# Patient Record
Sex: MALE | Race: WHITE | NOT HISPANIC OR LATINO | URBAN - METROPOLITAN AREA
[De-identification: names, ages, dates, MRNs, and addresses within clinical notes are randomized per-mention and may not be internally consistent; named-entity substitution may affect disease eponyms.]

---

## 2023-10-11 ENCOUNTER — LAB (OUTPATIENT)
Dept: LAB | Facility: LAB | Age: 23
End: 2023-10-11

## 2023-10-11 DIAGNOSIS — E55.9 VITAMIN D DEFICIENCY, UNSPECIFIED: ICD-10-CM

## 2023-10-11 DIAGNOSIS — Z12.5 ENCOUNTER FOR SCREENING FOR MALIGNANT NEOPLASM OF PROSTATE: ICD-10-CM

## 2023-10-11 DIAGNOSIS — E03.9 HYPOTHYROIDISM, UNSPECIFIED: ICD-10-CM

## 2023-10-11 DIAGNOSIS — N39.0 URINARY TRACT INFECTION, SITE NOT SPECIFIED: ICD-10-CM

## 2023-10-11 DIAGNOSIS — E78.5 HYPERLIPIDEMIA, UNSPECIFIED: ICD-10-CM

## 2023-10-11 DIAGNOSIS — D51.9 VITAMIN B12 DEFICIENCY ANEMIA, UNSPECIFIED: ICD-10-CM

## 2023-10-11 DIAGNOSIS — D52.9 FOLATE DEFICIENCY ANEMIA, UNSPECIFIED: ICD-10-CM

## 2023-10-11 DIAGNOSIS — E11.9 TYPE 2 DIABETES MELLITUS WITHOUT COMPLICATIONS (MULTI): Primary | ICD-10-CM

## 2023-10-11 DIAGNOSIS — E11.9 TYPE 2 DIABETES MELLITUS WITHOUT COMPLICATIONS (MULTI): ICD-10-CM

## 2023-10-11 LAB
ERYTHROCYTE [DISTWIDTH] IN BLOOD BY AUTOMATED COUNT: 12.3 % (ref 11.5–14.5)
EST. AVERAGE GLUCOSE BLD GHB EST-MCNC: 82 MG/DL
HBA1C MFR BLD: 4.5 %
HCT VFR BLD AUTO: 46 % (ref 41–52)
HGB BLD-MCNC: 15.8 G/DL (ref 13.5–17.5)
MCH RBC QN AUTO: 30.1 PG (ref 26–34)
MCHC RBC AUTO-ENTMCNC: 34.3 G/DL (ref 32–36)
MCV RBC AUTO: 88 FL (ref 80–100)
NRBC BLD-RTO: 0 /100 WBCS (ref 0–0)
PLATELET # BLD AUTO: 247 X10*3/UL (ref 150–450)
PMV BLD AUTO: 10.6 FL (ref 7.5–11.5)
RBC # BLD AUTO: 5.25 X10*6/UL (ref 4.5–5.9)
WBC # BLD AUTO: 4.5 X10*3/UL (ref 4.4–11.3)

## 2023-10-11 PROCEDURE — 85027 COMPLETE CBC AUTOMATED: CPT

## 2023-10-11 PROCEDURE — 81001 URINALYSIS AUTO W/SCOPE: CPT

## 2023-10-11 PROCEDURE — 36415 COLL VENOUS BLD VENIPUNCTURE: CPT

## 2023-10-11 PROCEDURE — 83036 HEMOGLOBIN GLYCOSYLATED A1C: CPT

## 2023-10-11 PROCEDURE — 84153 ASSAY OF PSA TOTAL: CPT

## 2023-10-11 PROCEDURE — 84443 ASSAY THYROID STIM HORMONE: CPT

## 2023-10-11 PROCEDURE — 80053 COMPREHEN METABOLIC PANEL: CPT

## 2023-10-11 PROCEDURE — 80061 LIPID PANEL: CPT

## 2023-10-11 PROCEDURE — 82306 VITAMIN D 25 HYDROXY: CPT

## 2023-10-11 PROCEDURE — 87086 URINE CULTURE/COLONY COUNT: CPT

## 2023-10-11 PROCEDURE — 82746 ASSAY OF FOLIC ACID SERUM: CPT

## 2023-10-11 PROCEDURE — 82607 VITAMIN B-12: CPT

## 2023-10-12 LAB
25(OH)D3 SERPL-MCNC: 14 NG/ML (ref 30–100)
ALBUMIN SERPL BCP-MCNC: 4.9 G/DL (ref 3.4–5)
ALP SERPL-CCNC: 53 U/L (ref 33–120)
ALT SERPL W P-5'-P-CCNC: 45 U/L (ref 10–52)
ANION GAP SERPL CALC-SCNC: 14 MMOL/L (ref 10–20)
AST SERPL W P-5'-P-CCNC: 24 U/L (ref 9–39)
BACTERIA UR CULT: NO GROWTH
BILIRUB SERPL-MCNC: 0.6 MG/DL (ref 0–1.2)
BUN SERPL-MCNC: 8 MG/DL (ref 6–23)
CALCIUM SERPL-MCNC: 9.8 MG/DL (ref 8.6–10.6)
CHLORIDE SERPL-SCNC: 102 MMOL/L (ref 98–107)
CHOLEST SERPL-MCNC: 163 MG/DL (ref 0–199)
CHOLESTEROL/HDL RATIO: 4
CO2 SERPL-SCNC: 26 MMOL/L (ref 21–32)
CREAT SERPL-MCNC: 0.89 MG/DL (ref 0.5–1.3)
FOLATE SERPL-MCNC: 14.7 NG/ML
GFR SERPL CREATININE-BSD FRML MDRD: >90 ML/MIN/1.73M*2
GLUCOSE SERPL-MCNC: 86 MG/DL (ref 74–99)
HDLC SERPL-MCNC: 41.1 MG/DL
LDLC SERPL CALC-MCNC: 96 MG/DL (ref 110–150)
MUCOUS THREADS #/AREA URNS AUTO: NORMAL /LPF
NON HDL CHOLESTEROL: 122 MG/DL (ref 0–119)
POTASSIUM SERPL-SCNC: 4.3 MMOL/L (ref 3.5–5.3)
PROT SERPL-MCNC: 8 G/DL (ref 6.4–8.2)
PSA SERPL-MCNC: 0.36 NG/ML
RBC #/AREA URNS AUTO: NORMAL /HPF
SODIUM SERPL-SCNC: 138 MMOL/L (ref 136–145)
TRIGL SERPL-MCNC: 131 MG/DL (ref 0–149)
TSH SERPL-ACNC: 1.36 MIU/L (ref 0.44–3.98)
VIT B12 SERPL-MCNC: 349 PG/ML (ref 211–911)
VLDL: 26 MG/DL (ref 0–40)
WBC #/AREA URNS AUTO: NORMAL /HPF

## 2024-07-31 ENCOUNTER — ALLIED HEALTH (OUTPATIENT)
Dept: INTEGRATIVE MEDICINE | Facility: CLINIC | Age: 24
End: 2024-07-31

## 2024-07-31 DIAGNOSIS — R11.2 NAUSEA & VOMITING: Primary | ICD-10-CM

## 2024-07-31 DIAGNOSIS — R10.9 ABDOMINAL PAIN: ICD-10-CM

## 2024-07-31 PROCEDURE — 97139 UNLISTED THERAPEUTIC PX: CPT | Performed by: ACUPUNCTURIST

## 2024-07-31 NOTE — PROGRESS NOTES
Acupuncture Visit:     Subjective   Patient ID: Hector Pearson is a 23 y.o. male who presents for No chief complaint on file.  Seeking support for abdominal pain that vacillates between dull achy and stabbing pain.  Constant nausea with intermittent emesis        Session Information  Visit Type: New patient         Review of Systems         Provider reviewed plan for the acupuncture session, precautions and contraindications. Patient/guardian/hospital staff has given consent to treat with full understanding of what to expect during the session. Before acupuncture began, provider explained to the patient to communicate at any time if the procedure was causing discomfort past their tolerance level. Patient agreed to advise acupuncturist. The acupuncturist counseled the patient on the risks of acupuncture treatment including pain, infection, bleeding, and no relief of pain. The patient was positioned comfortably. There was no evidence of infection at the site of needle insertions.    Objective   Physical Exam    Acupuncture Physical Exam  Tongue Color: Red edges  Tongue Coating: Peeled    Treatment Plan  Treatment Goals: Pain management, Nausea reduction    Acupuncture Treatment  Patient Position: Seated and reclining  Needle Guage: 42 guage /.14/ Lime green seirin, 40 guage /.16/ Red seirin  Body Points - Left: dlpfcx2, p6  Body Points - Bilateral: lr 3, li 4, st qi x2, sp 6, k 7, 10  Needle Count In: 17  Needle Count Out: 17  Needle Retention Time (min): 30 minutes  Total Face to Face Time (min): 25 minutes              Assessment/Plan

## 2024-07-31 NOTE — PATIENT INSTRUCTIONS
Follow with [facility]. Pt tolerated tx well.  Immediate pt reported benefits of minimal nausea and no pain.  Advised to engage in gentle activity following tx and hydrate.  Schedule 4-6 follow tx with re-evaluation

## 2024-08-01 ENCOUNTER — DOCUMENTATION (OUTPATIENT)
Dept: GASTROENTEROLOGY | Facility: HOSPITAL | Age: 24
End: 2024-08-01
Payer: COMMERCIAL

## 2024-08-01 NOTE — PROGRESS NOTES
Dr. Omid Leyva's office received a referral from Erlin Tello PA-C for this patient to undergo a SIBO breath test. Dr. Vasile Dunlap reviewed the referral on 7/30/2024. Per Dr. Dunlap, OK to schedule.    [facility] centralized schedulers were notified to call and schedule this patient. Contact Noemi Griffith RN at [phone] for any questions.      See below for supporting clinical information from the referral sent by  Erlin Tello's office and from medical records:    Diagnosis: Generalized abdominal pain     Reason for Referral     Consultation (Routine) - Pending Review  Reason for Referral - Consultation (Routine) - Pending Review  Specialty          Diagnoses / Procedures          Referred By Contact            Referred To Contact  Gastroenterology        Diagnoses  Generalized abdominal pain     Procedures  MT OFFICE/OUTPATIENT NEW HIGH MDM 60 MINUTES             Erlin Tello PA-C  [address]  [address]  [place] [address]  Phone: [phone]  Fax: [phone]      Omid Leyva DO  [address] [place] [address]  Phone: [phone]     Reason for Referral - Consultation (Routine) - Pending Review  Referral ID       Status  Reason Start Date        Expiration Date            Visits Requested         Visits Authorized  [id number]          Pending Review          Specialty Services Required             7/29/2024 7/29/2025       1          1     Reason for Referral - Consultation (Routine) - Pending Review  Scheduling Instructions  SIBO breath test     Progress Notes  - documented in this encounter  Erlin Tello PA-C - 07/29/2024 1:00 PM EDT  Formatting of this note is different from the original.  Images from the original note were not included.     [facility] MEDICAL [facility] GASTROENTEROLOGY  [address]  [address]  [facility] [address]  Dept: [phone]  Dept Fax: [phone]  Loc: [phone]     Visit type: Follow up.     Reason for Visit:  Follow-up     Assessment and Plan     Problem List Items Addressed This Visit  None  Visit Diagnoses     History of esophagogastroduodenoscopy (EGD) - Primary  Relevant Medications  famotidine (Pepcid) 20 MG tablet  History of colonoscopy  Nausea  Generalized abdominal pain  Relevant Orders  External referral to Gastroenterology  History of marijuana use  History of alcohol use  History of tobacco use           -EGD with gastric and duodenal biopsies for further evaluation of generalized abdominal pain, nausea, vomiting, weight loss, overall unrevealing. Most likely Cannabidiol Hyperemesis syndrome. It is N/V is slowing resolving after stopping THC two week ago. CT shows normal pancreas, liver, gallbladder. US shows normal gallbladder, liver. HIDA and GES are both unremarkable. CBC and CMP are unremarkable.  -Stop Omeprazole 40 mg daily, Carafate.  -Start Pepcid 20 mg daily.  -Continue antiemetics PRN.  -Continue secession from products containing THC.  -Continue secession from products containing EtOH.  -Continue secession from NSAIDs.  -Hector wishes to be screened for SIBO, will place orders.  -Colonoscopy for further evaluation of abnormal CT unremarkable. Repeat colonoscopy age 47 for screening.     Follow up in about 3 months (around 10/29/2024).     Advised patient to call office with new or worsening symptoms, questions, or concerns. Patient verbalized understanding and agreement of plan.     Kameron Bass is a 23 y.o. male who presents as a follow up to EGD and colonoscopy. He was referred by Dr. Tobi Esparza MD regarding Nausea; Abnormal weight loss.     HPI     Last dose of THC was 7/15/2024. Feels slightly better. Last episode of emesis was a couple days ago, previously daily. Still have continued abdominal pain and nausea. Has not had to take anti emetic recently. Has a BM daily that is formed or loose. No hematochezia.     EtOH: None since 1/2024. Previously drinking 375 mL liquor for  days.  Tobacco: Previously Zyn and Cigarettes.  NSAIDs: None.  Illicit drugs: Mariajuana every other day.  Family History of Colon Cancer: None.  Prior EGD/Colonoscopy: None.  Prior Abdominal Surgery: None.     Review of Systems  Constitutional: Negative for appetite change, chills, diaphoresis, fever and unexpected weight change.  HENT: Negative for trouble swallowing.  Eyes: Negative for pain and redness.  Respiratory: Negative for cough, choking and shortness of breath.  Cardiovascular: Negative for chest pain.  Gastrointestinal: Positive for abdominal pain and nausea. Negative for abdominal distention, anal bleeding, blood in stool, constipation, diarrhea, rectal pain and vomiting.  Musculoskeletal: Positive for arthralgias (ankles, knees, shoulders.).  Skin: Negative for color change, pallor and rash.  Allergic/Immunologic: Negative for food allergies.        No Known Allergies     Outpatient Medications Prior to Visit  Medication Sig Dispense Refill  LORazepam (Ativan) 0.5 MG tablet Take 1 tablet (0.5 mg) by mouth every 8 hours as needed for anxiety for up to 10 days. (Patient not taking: Reported on 7/29/2024)  mirtazapine (Remeron) 7.5 MG tablet Take 1 tablet (7.5 mg) by mouth Nightly. (Patient not taking: Reported on 7/29/2024)  promethazine (Phenergan) 12.5 MG tablet Take 1 tablet (12.5 mg) by mouth every 6 hours as needed for nausea or vomiting for up to 7 days. (Patient not taking: Reported on 7/29/2024)  pantoprazole (ProtoNix) 40 MG EC tablet Take 1 tablet (40 mg) by mouth every morning (before breakfast). Do not crush, chew, or split. Do not start before July 23, 2024. (Patient not taking: Reported on 7/29/2024)     No facility-administered medications prior to visit.        Patient Active Problem List  Diagnosis Date Noted Date Diagnosed  Intractable nausea and vomiting 07/21/2024  Priority: Medium  Shoulder instability, left 01/22/2019     Social History     Tobacco Use  Smoking status:  Never  Smokeless tobacco: Never  Substance Use Topics  Alcohol use: Not Currently        No family history on file.     Objective     Pulse 78  Temp 36.2 °C (97.2 °F)  Ht 6' (1.829 m)  Wt 168 lb (76.2 kg)  SpO2 99%  BMI 22.78 kg/m²  Physical Exam  Vitals reviewed.  Constitutional:  General: He is not in acute distress.  Appearance: Normal appearance. He is not ill-appearing.  Cardiovascular:  Rate and Rhythm: Normal rate and regular rhythm.  Pulmonary:  Effort: Pulmonary effort is normal.  Breath sounds: Normal breath sounds.  Abdominal:  General: Bowel sounds are normal. There is no distension.  Palpations: Abdomen is soft. There is no mass.  Tenderness: There is abdominal tenderness (mild epigastric tenderness with deep palpation). There is no guarding or rebound.  Hernia: No hernia is present.  Neurological:  General: No focal deficit present.  Mental Status: He is alert and oriented to person, place, and time.  Psychiatric:  Mood and Affect: Mood normal.  Behavior: Behavior normal.     Data Reviewed and Summarized     Labs:     Lab Results  Component Value Date  WBC 5.1 07/21/2024  HGB 17.3 07/21/2024  HCT 48.6 07/21/2024  MCV 78.9 07/21/2024   07/21/2024     Lab Results  Component Value Date  GLUCOSE 94 07/21/2024  CALCIUM 10.2 07/21/2024   07/21/2024  K 5.1 07/21/2024  CO2 17 (L) 07/21/2024   07/21/2024  BUN 16 07/21/2024  CREATININE 0.98 07/21/2024     Lab Results  Component Value Date  ALT 14 07/21/2024  AST 33 07/21/2024  ALKPHOS 47 07/21/2024  BILITOT 1.6 (H) 07/21/2024     Lab Results  Component Value Date  LIPASE 39 07/21/2024     Lab Results  Component Value Date  CREACTIVEPRO <3.0 07/25/2024     Thyroid Stimulating Hormone 11/11/2023  Order: [id number]  Component  Ref Range & Units 9 mo ago  Thyroid Stimulating Hormone  0.44 - 3.98 mIU/L 1.36        Component 8 d ago  THC, URINE Positive  Comment: THC metabolites have been screened for by Immunoassay at a 50 ng/mL threshold.  POSITIVE results are not confirmed by a more specific alternative method unless requested. If confirmation is needed, request confirmation under separate order. NOTE: These results are for medical treatment only. Analysis performed using non-forensic procedures.     Imaging/Testing:     EGD 7/24/2024 Dr. Marcial  Procedure: Upper GI endoscopy  Indications: Upper abdominal symptoms that persist despite an  appropriate trial of therapy, , Early satiety, Nausea  with vomiting, Weight loss, loose stool  Findings:  The examined duodenum was normal. Biopsies for histology were taken with  a cold forceps for evaluation of celiac disease.  Patchy mildly erythematous mucosa without bleeding was found in the  stomach. Biopsies were taken with a cold forceps for Helicobacter pylori  testing.  The examined esophagus was normal.  Impression:  - Normal examined duodenum. Biopsied.  - Erythematous mucosa in the stomach. Biopsied.  - Normal esophagus.  Recommendation:  - Patient has a contact number available for emergencies. The signs and  symptoms of potential delayed complications were discussed with the  patient. Return to normal activities tomorrow. Written discharge  instructions were provided to the patient.  - Await pathology results.  - Continue present medications.  - Resume previous diet.  - Return to referring physician as previously scheduled.  - Perform a colonoscopy today.     Colonoscopy 7/24/2024 Dr. Marcial  Procedure: Colonoscopy  Indications: Loose stool, Evaluation of abnormal imaging study of  the gastrointestinal tract (likely to be clinically  significant)  , This is the patient's first colonoscopy  Findings:  The digital rectal exam was normal.  An area of mucosa in the terminal ileum was slightly nodular.  Endoscopically seemed suggestive of lymphoid aggregates. Biopsies were  taken with a cold forceps for histology.  The ascending colon appeared normal. Biopsies were taken with a cold  forceps for  histology.  There is no endoscopic evidence of bleeding or inflammation in the  entire colon. Biopsies were taken with a cold forceps for histology.  Internal hemorrhoids were found during retroflexion. The hemorrhoids  were small and Grade I (internal hemorrhoids that do not prolapse).  Liquid brown stool was found in the entire colon. There was no fresh or  old blood seen.  Impression:  - Preparation of the colon was fair.  - Nodular ileal mucosa. Biopsied.  - The ascending colon is normal. Biopsied.  - Internal hemorrhoids.  - Stool in the entire examined colon.  Recommendation:  - Patient has a contact number available for emergencies. The signs and  symptoms of potential delayed complications were discussed with the  patient. Return to normal activities tomorrow. Written discharge  instructions were provided to the patient.  - Await pathology results.  - Continue present medications.  - Resume previous diet.  - High fiber diet.  - Repeat colonoscopy is recommended. The colonoscopy date will be  determined after pathology results from today's exam become available  for review.  - Return to referring physician as previously scheduled.  - Return to GI clinic as previously scheduled.     CT abdomen pelvis w contrast 7/21/2024  FINDINGS:     LOWER CHEST: within normal limits.     ABDOMEN:  LIVER: within normal limits.  BILE DUCTS: normal caliber.  GALLBLADDER: No calcified gallstones. Normal caliber wall.  PANCREAS: within normal limits.  SPLEEN: Top normal size spleen  ADRENALS: within normal limits.  KIDNEYS: within normal limits.     PELVIS:  REPRODUCTIVE ORGANS: no pelvic masses.  URETERS: within normal limits.  BLADDER: within normal limits.     BOWEL: Normal caliber. Findings within normal limits. No enlarged mesenteric lymph nodes.  PERITONEUM: no ascites or free air, no fluid collection.  VESSELS: Within normal limits.  LYMPH NODES: No enlarged nodes.  RETROPERITONEUM: within normal limits.  ABDOMINAL WALL:  within normal limits.  BONES: within normal limits.     IMPRESSION:  No acute process in the abdomen or pelvis.     CT abdomen pelvis w contrast 7/5/2024  FINDINGS:  Chest base: Normal.     Liver: Normal size and contour. 8 mm right hepatic lobe hypodensity is too small to definitively characterize, but likely represents a small cyst. No follow-up imaging is recommended.     Biliary tree: No calcified gallstones. No pericholecystic fluid. No abnormal biliary ductal dilatation.     Pancreas: Normal.     Spleen: Normal.     Adrenals: Normal.     Kidneys: Symmetric contrast enhancement without hydronephrosis. No focal lesion.     Free fluid: None.     Vasculature: Normal caliber.     Bowel: Contrast material in the stomach, small bowel, and colon. No bowel obstruction. There appears to be mild circumferential wall thickening of the entire ascending colon. This portion of the colon is not well distended, slightly limiting evaluation of the colonic wall. No significant adjacent inflammation. No evidence of acute appendicitis. No bowel wall pneumatosis.     Lymphadenopathy: None.     Pelvic organs/viscera: No mass identified.     Osseous structures: No abnormality.     Soft Tissues: Normal.     IMPRESSION:  Question of mild proximal colitis. Otherwise, unremarkable examination.     NM gastric emptying solid 7/2/2024  IMPRESSION:  Impression: NORMAL GASTRIC EMPTYING OF SOLIDS.     NM HIDA 7/2/2024  IMPRESSION:  Normal biliary function in response to CCK.     US abdomen limited 12/28/2023  FINDINGS:     LIVER  Echogenicity: There is increased hepatic parenchymal echogenicity and coarsening of the hepatic parenchymal echotexture.  Surface nodularity: Not visualized.  Masses (size and location): None.     GALLBLADDER  Size and morphology: Normal caliber and wall thickness.  Cholelithiasis: None.  Pericholecystic fluid: None.  Sonographic Chun's sign: Negative.     BILE DUCTS  Intrahepatic ducts: No biliary dilation  Common  bile duct: Normal caliber. Diameter: 2 mm     PANCREAS  Evaluation is limited due to overlying bowel gas artifact.     RIGHT KIDNEY  Size: 11.8 x 5.7 x 4.6 cm  Renal cortex: Normal.  Hydronephrosis: None.  Calculi, cysts or masses: None.     OTHER FINDINGS  None.     IMPRESSION:  1. No acute sonographic abnormality identified. Perihepatic steatosis.  2. Suboptimal visualization of the pancreas.        Erlin Tello PA-C  4:05 PM  07/29/24     Electronically signed by Erlin Tello PA-C at 07/29/2024 4:14 PM EDT  Plan of Treatment  - documented as of this encounter  Plan of Treatment - Upcoming Encounters  Upcoming Encounters  Date    Type    Department    Care Team (Latest Contact Info)            Description  10/29/2024 1:00 PM EDT       Office Visit       [facility]  [address]  [address] [place] [id number] [phone] Erlin Tello PA-C  [address]  [address] [place] [address] [id number] [phone] (Work)  [phone] (Fax)           Plan of Treatment - Scheduled Referrals  Scheduled Referrals  Name  Type    Priority Associated Diagnoses  Order Schedule  External referral to Gastroenterology Outpatient Referral            Routine            Generalized abdominal pain   Expected: 07/29/2024 (Approximate), Expires: 01/29/2025

## 2024-08-07 ENCOUNTER — APPOINTMENT (OUTPATIENT)
Dept: INTEGRATIVE MEDICINE | Facility: CLINIC | Age: 24
End: 2024-08-07
Payer: MEDICAID

## 2024-08-07 DIAGNOSIS — R10.9 ABDOMINAL PAIN: ICD-10-CM

## 2024-08-07 DIAGNOSIS — R11.0 NAUSEA: Primary | ICD-10-CM

## 2024-08-07 DIAGNOSIS — F32.A DEPRESSION: ICD-10-CM

## 2024-08-07 PROCEDURE — 97139 UNLISTED THERAPEUTIC PX: CPT | Performed by: ACUPUNCTURIST

## 2024-08-07 NOTE — PROGRESS NOTES
Acupuncture Visit:     Subjective   Patient ID: Hector Pearson is a 23 y.o. male who presents for No chief complaint on file.  Reported that he is progressively better.  Emesis is resolved since last tx.  Abdominal pain has a wrapping quality to low back pack with acid reflux and nausea.  Seeking support for depression      Session Information  Is this acupuncture treatment being billed to the patient's insurance company: No  Visit Type: Follow-up visit         Review of Systems         Provider reviewed plan for the acupuncture session, precautions and contraindications. Patient/guardian/hospital staff has given consent to treat with full understanding of what to expect during the session. Before acupuncture began, provider explained to the patient to communicate at any time if the procedure was causing discomfort past their tolerance level. Patient agreed to advise acupuncturist. The acupuncturist counseled the patient on the risks of acupuncture treatment including pain, infection, bleeding, and no relief of pain. The patient was positioned comfortably. There was no evidence of infection at the site of needle insertions.    Objective   Physical Exam    Acupuncture Physical Exam  Tongue Coating: Thick yellow    Treatment Plan  Treatment Goals: Nausea reduction, Mood modification, Pain management    Acupuncture Treatment  Patient Position: Seated and reclining  Needle Guage: 42 guage /.14/ Lime green seirin, 38 guage /.18/ Yellow seirin  Body Points - Left: dlpfcx2, k 3  Body Points - Bilateral: immune, sp 6, st qi x2, sp 9  Body Points - Right: k 7, 10  Needle Count In: 15  Needle Count Out: 15  Needle Retention Time (min): 30 minutes  Total Face to Face Time (min): 25 minutes              Assessment/Plan

## 2024-08-12 ENCOUNTER — APPOINTMENT (OUTPATIENT)
Dept: INTEGRATIVE MEDICINE | Facility: CLINIC | Age: 24
End: 2024-08-12
Payer: COMMERCIAL

## 2024-08-12 DIAGNOSIS — R11.0 NAUSEA: Primary | ICD-10-CM

## 2024-08-12 DIAGNOSIS — R10.9 STOMACH PAIN: ICD-10-CM

## 2024-08-12 DIAGNOSIS — F32.A DEPRESSION: ICD-10-CM

## 2024-08-12 PROCEDURE — 97139 UNLISTED THERAPEUTIC PX: CPT | Performed by: ACUPUNCTURIST

## 2024-08-12 NOTE — PROGRESS NOTES
Acupuncture Visit:     Subjective   Patient ID: Hector Pearson is a 23 y.o. male who presents for No chief complaint on file.  Pt reported benefits last about 5-7 days with onset of nausea and pain progressively decreasing intensity.  Presenting today with mild nausea and pain.        Session Information  Is this acupuncture treatment being billed to the patient's insurance company: No  Visit Type: Follow-up visit         Review of Systems         Provider reviewed plan for the acupuncture session, precautions and contraindications. Patient/guardian/hospital staff has given consent to treat with full understanding of what to expect during the session. Before acupuncture began, provider explained to the patient to communicate at any time if the procedure was causing discomfort past their tolerance level. Patient agreed to advise acupuncturist. The acupuncturist counseled the patient on the risks of acupuncture treatment including pain, infection, bleeding, and no relief of pain. The patient was positioned comfortably. There was no evidence of infection at the site of needle insertions.    Objective   Physical Exam              Acupuncture Treatment  Patient Position: Seated and reclining  Needle Guage: 42 guage /.14/ Lime green reno  Body Points - Left: dlpfcx2, kate 4, lr4, ht 7  Body Points - Bilateral: k 9, li 15, immunex2,  Needle Count In: 13  Needle Count Out: 13  Needle Retention Time (min): 25 minutes  Total Face to Face Time (min): 25 minutes              Assessment/Plan

## 2024-08-13 ENCOUNTER — APPOINTMENT (OUTPATIENT)
Dept: INTEGRATIVE MEDICINE | Facility: CLINIC | Age: 24
End: 2024-08-13
Payer: COMMERCIAL

## 2024-08-15 ENCOUNTER — APPOINTMENT (OUTPATIENT)
Dept: INTEGRATIVE MEDICINE | Facility: CLINIC | Age: 24
End: 2024-08-15
Payer: COMMERCIAL

## 2024-08-21 ENCOUNTER — APPOINTMENT (OUTPATIENT)
Dept: INTEGRATIVE MEDICINE | Facility: CLINIC | Age: 24
End: 2024-08-21
Payer: COMMERCIAL

## 2024-08-21 ENCOUNTER — ALLIED HEALTH (OUTPATIENT)
Dept: INTEGRATIVE MEDICINE | Facility: CLINIC | Age: 24
End: 2024-08-21
Payer: MEDICAID

## 2024-08-21 DIAGNOSIS — M54.59 OTHER LOW BACK PAIN: Primary | ICD-10-CM

## 2024-08-21 DIAGNOSIS — F32.A ANXIETY AND DEPRESSION: ICD-10-CM

## 2024-08-21 DIAGNOSIS — F41.9 ANXIETY AND DEPRESSION: ICD-10-CM

## 2024-08-21 DIAGNOSIS — R11.2 NAUSEA AND VOMITING, UNSPECIFIED VOMITING TYPE: ICD-10-CM

## 2024-08-21 PROCEDURE — 99212 OFFICE O/P EST SF 10 MIN: CPT | Performed by: ACUPUNCTURIST

## 2024-08-21 PROCEDURE — 97810 ACUP 1/> WO ESTIM 1ST 15 MIN: CPT | Performed by: ACUPUNCTURIST

## 2024-08-21 PROCEDURE — 97811 ACUP 1/> W/O ESTIM EA ADD 15: CPT | Performed by: ACUPUNCTURIST

## 2024-08-21 NOTE — PROGRESS NOTES
"Acupuncture Visit:     Subjective   Patient ID: Hector Pearson is a 23 y.o. male who presents for No chief complaint on file.  2024 Acupuncture Benefits   08/12/2024    Human [facility] # [id number]   30 Visits per calendar year (Jan-Dec)   Covered Diagnosis; low back pain, migraine, cervical (neck) pain, osteoarthritis of knee and/or hip, nausea or vomiting related to pregnancy or chemo; or acute post operative pain.   Ref #:  [id number]     Other low back pain  Nausea and vomiting  Chronic gastritis   Anxiety and depression     Other low back pain:  Location: pain is a band that wraps around his waist and goes across his back  LBP and abdominal pain has been much wrose the past 6 months   7/10 pain level daily range   Pain radiates into the back from the front    Heat does not help reduce his pain   Ice feels good on his back/reduced pain   DX of chronic Gastritis (does not have acid reflux, IBS, ulcers, etc.)  Cannot take NSAIDS because of the gastritis   Abdominal and back pain is worse when his stomach is empty   Nausea and vomiting started around the same time as the back and stomach pain  When his nausea is worse his LBP and abdominal pain is worse   Has had 3 group sessions so far, this seems to help reduce his pain temporarily (for a few hours P tx)  9/10 nausea daily - irritability with gastritis sxs  Notes Feels a sensation \"creeping up\" his chest, denies burning sensations in upper chest/abd (notes burning in lower abdomen, however)  HX of Depression anxiety and irritability  Drinking [place] - this seems to be the only thing that helps reduce his SXS/coats his stomach   Sleep - wakes up routinely - bad dreams restless, vivid dreams   Energy level is low   Gets motion sickness with increase of his nausea   Cannot eat any acidic foods, this bothers his stomach, cannot eat fried foods, no chocolate           Session Information  Is this acupuncture treatment being billed to the patient's " insurance company: Yes  This is visit number: 1  The patient has a total number of visits of: 30  Initial Acupuncture Treatment date: 08/21/24  Name of Insurance Company: [employer]  Name of Supervising Physician: BRYCE  Visit Type: New patient  Description of present complaint: Chronic pain         Review of Systems         Provider reviewed plan for the acupuncture session, precautions and contraindications. Patient/guardian/hospital staff has given consent to treat with full understanding of what to expect during the session. Before acupuncture began, provider explained to the patient to communicate at any time if the procedure was causing discomfort past their tolerance level. Patient agreed to advise acupuncturist. The acupuncturist counseled the patient on the risks of acupuncture treatment including pain, infection, bleeding, and no relief of pain. The patient was positioned comfortably. There was no evidence of infection at the site of needle insertions.    Objective   Physical Exam                             Assessment/Plan

## 2024-08-23 ENCOUNTER — APPOINTMENT (OUTPATIENT)
Dept: INTEGRATIVE MEDICINE | Facility: CLINIC | Age: 24
End: 2024-08-23
Payer: MEDICAID

## 2024-08-27 ENCOUNTER — APPOINTMENT (OUTPATIENT)
Dept: INTEGRATIVE MEDICINE | Facility: CLINIC | Age: 24
End: 2024-08-27
Payer: MEDICAID

## 2024-08-28 ENCOUNTER — ALLIED HEALTH (OUTPATIENT)
Dept: INTEGRATIVE MEDICINE | Facility: CLINIC | Age: 24
End: 2024-08-28
Payer: MEDICAID

## 2024-08-28 ENCOUNTER — APPOINTMENT (OUTPATIENT)
Dept: INTEGRATIVE MEDICINE | Facility: CLINIC | Age: 24
End: 2024-08-28
Payer: COMMERCIAL

## 2024-08-28 ENCOUNTER — APPOINTMENT (OUTPATIENT)
Dept: INTEGRATIVE MEDICINE | Facility: CLINIC | Age: 24
End: 2024-08-28
Payer: MEDICAID

## 2024-08-28 DIAGNOSIS — R10.9 STOMACH PAIN: ICD-10-CM

## 2024-08-28 DIAGNOSIS — F41.9 ANXIETY AND DEPRESSION: ICD-10-CM

## 2024-08-28 DIAGNOSIS — R11.2 NAUSEA AND VOMITING, UNSPECIFIED VOMITING TYPE: ICD-10-CM

## 2024-08-28 DIAGNOSIS — F32.A ANXIETY AND DEPRESSION: ICD-10-CM

## 2024-08-28 DIAGNOSIS — M54.59 OTHER LOW BACK PAIN: Primary | ICD-10-CM

## 2024-08-28 PROCEDURE — 97810 ACUP 1/> WO ESTIM 1ST 15 MIN: CPT | Performed by: ACUPUNCTURIST

## 2024-08-28 PROCEDURE — 97811 ACUP 1/> W/O ESTIM EA ADD 15: CPT | Performed by: ACUPUNCTURIST

## 2024-08-28 NOTE — PROGRESS NOTES
"Acupuncture Visit:     Subjective   Patient ID: Hector Pearson is a 23 y.o. male who presents for No chief complaint on file.  2024 Acupuncture Benefits   08/12/2024    [employer] [email or web address] [facility] # [id number]   30 Visits per calendar year (Jan-Dec)   Covered Diagnosis; low back pain, migraine, cervical (neck) pain, osteoarthritis of knee and/or hip, nausea or vomiting related to pregnancy or chemo; or acute post operative pain.   Ref #:  [id number]     Other low back pain  Nausea and vomiting  Chronic gastritis   Anxiety and depression     LBP, nausea, gastritis sxs improved post acupuncture for 4-5 days p tx  Feels the anxiety and depression improved the least.    Other low back pain:  Location: pain is a band that wraps around his waist and goes across his back  LBP and abdominal pain has been much wrose the past 6 months   7/10 pain level daily range   Pain radiates into the back from the front    Heat does not help reduce his pain   Ice feels good on his back/reduced pain   DX of chronic Gastritis (does not have acid reflux, IBS, ulcers, etc.)  Cannot take NSAIDS because of the gastritis   Abdominal and back pain is worse when his stomach is empty   Nausea and vomiting started around the same time as the back and stomach pain  When his nausea is worse his LBP and abdominal pain is worse   Has had 3 group sessions so far, this seems to help reduce his pain temporarily (for a few hours P tx)  9/10 nausea daily - irritability with gastritis sxs  Notes Feels a sensation \"creeping up\" his chest, denies burning sensations in upper chest/abd (notes burning in lower abdomen, however)  HX of Depression anxiety and irritability  Drinking [place] - this seems to be the only thing that helps reduce his SXS/coats his stomach   Sleep - wakes up routinely - bad dreams restless, vivid dreams   Energy level is low   Gets motion sickness with increase of his nausea   Cannot eat any acidic foods, this bothers his stomach, " cannot eat fried foods, no chocolate           Session Information  Is this acupuncture treatment being billed to the patient's insurance company: Yes  This is visit number: 2  The patient has a total number of visits of: 30  Initial Acupuncture Treatment date: 08/21/24  Name of Insurance Company: [employer]  Name of Supervising Physician: BRYCE  Visit Type: Follow-up visit  Description of present complaint: Chronic pain         Review of Systems         Provider reviewed plan for the acupuncture session, precautions and contraindications. Patient/guardian/hospital staff has given consent to treat with full understanding of what to expect during the session. Before acupuncture began, provider explained to the patient to communicate at any time if the procedure was causing discomfort past their tolerance level. Patient agreed to advise acupuncturist. The acupuncturist counseled the patient on the risks of acupuncture treatment including pain, infection, bleeding, and no relief of pain. The patient was positioned comfortably. There was no evidence of infection at the site of needle insertions.    Objective   Physical Exam              Acupuncture Treatment  Patient Position: Supine on a table  Acupuncture Needling: Yes  Needle Guage: 36 guage /.20/ Blue seirin  Body Points: With retention  Body Points - Bilateral: YT - CV14.5 ST25 ST32 ST41 - PC6  Auricular Points: Yes  Auricular Points - Left: Mouth, neck/adrenal, PZ, BFA4, SM (4  Needle Count In: 15  Needle Count Out: 15  Needle Retention Time (min): 35 minutes  Total Face to Face Time (min): 25 minutes              Assessment/Plan

## 2024-08-29 ENCOUNTER — APPOINTMENT (OUTPATIENT)
Dept: INTEGRATIVE MEDICINE | Facility: CLINIC | Age: 24
End: 2024-08-29
Payer: MEDICAID

## 2024-08-30 ENCOUNTER — APPOINTMENT (OUTPATIENT)
Dept: INTEGRATIVE MEDICINE | Facility: CLINIC | Age: 24
End: 2024-08-30
Payer: MEDICAID

## 2024-09-04 ENCOUNTER — APPOINTMENT (OUTPATIENT)
Dept: INTEGRATIVE MEDICINE | Facility: CLINIC | Age: 24
End: 2024-09-04
Payer: COMMERCIAL

## 2024-09-05 ENCOUNTER — APPOINTMENT (OUTPATIENT)
Dept: INTEGRATIVE MEDICINE | Facility: CLINIC | Age: 24
End: 2024-09-05
Payer: MEDICAID

## 2024-09-05 DIAGNOSIS — M54.59 OTHER LOW BACK PAIN: Primary | ICD-10-CM

## 2024-09-05 DIAGNOSIS — R11.2 NAUSEA & VOMITING: ICD-10-CM

## 2024-09-05 PROCEDURE — 97811 ACUP 1/> W/O ESTIM EA ADD 15: CPT | Performed by: ACUPUNCTURIST

## 2024-09-05 PROCEDURE — 97810 ACUP 1/> WO ESTIM 1ST 15 MIN: CPT | Performed by: ACUPUNCTURIST

## 2024-09-05 NOTE — PROGRESS NOTES
Acupuncture Visit:     Subjective   Patient ID: Hector Pearson is a 23 y.o. male who presents for No chief complaint on file.  Pt reported that  his low back pain has improved.  No change in anxiety and depression.  He ate a pasta with large amounts of cheese yesterday which escalated nausea and stomach pain.        Session Information  Is this acupuncture treatment being billed to the patient's insurance company: Yes  This is visit number: 3  The patient has a total number of visits of: 30  Initial Acupuncture Treatment date: 08/21/24  Name of Insurance Company: [employer]  Name of Supervising Physician: BRYCE  Visit Type: Follow-up visit  Description of present complaint: Chronic pain         Review of Systems         Provider reviewed plan for the acupuncture session, precautions and contraindications. Patient/guardian/hospital staff has given consent to treat with full understanding of what to expect during the session. Before acupuncture began, provider explained to the patient to communicate at any time if the procedure was causing discomfort past their tolerance level. Patient agreed to advise acupuncturist. The acupuncturist counseled the patient on the risks of acupuncture treatment including pain, infection, bleeding, and no relief of pain. The patient was positioned comfortably. There was no evidence of infection at the site of needle insertions.    Objective   Physical Exam         Treatment Plan  Treatment Goals: Pain management, Nausea reduction, Anxiety reduction    Acupuncture Treatment  Patient Position: Supine on a table  Needle Guage: 40 guage /.16/ Red seirin, 36 guage /.20/ Blue seirin, 42 guage /.14/ Lime green seirin  Body Points - Left: dplfcx2, kate 5, lr4, cv 12, 4, st 24  Body Points - Bilateral: k6, 27, immunex2, st qix2, pc metal water  Needle Count In: 23  Needle Count Out: 23  Needle Retention Time (min): 30 minutes  Total Face to Face Time (min): 25 minutes               Assessment/Plan

## 2024-09-09 ENCOUNTER — APPOINTMENT (OUTPATIENT)
Dept: INTEGRATIVE MEDICINE | Facility: CLINIC | Age: 24
End: 2024-09-09
Payer: MEDICAID

## 2024-09-09 DIAGNOSIS — R11.0 NAUSEA: ICD-10-CM

## 2024-09-09 DIAGNOSIS — M54.59 OTHER LOW BACK PAIN: Primary | ICD-10-CM

## 2024-09-09 PROCEDURE — 97810 ACUP 1/> WO ESTIM 1ST 15 MIN: CPT | Performed by: ACUPUNCTURIST

## 2024-09-09 PROCEDURE — 97811 ACUP 1/> W/O ESTIM EA ADD 15: CPT | Performed by: ACUPUNCTURIST

## 2024-09-09 NOTE — PROGRESS NOTES
Acupuncture Visit:     Subjective   Patient ID: Hector Pearson is a 23 y.o. male who presents for No chief complaint on file.  Pt reported that he has decreased stomach pain and low back pain.  He reported that nausea improved until yesterday and he currently has indigestions.          Session Information  Is this acupuncture treatment being billed to the patient's insurance company: Yes  This is visit number: 4  Name of Insurance Company: [employer]  Visit Type: Follow-up visit         Review of Systems         Provider reviewed plan for the acupuncture session, precautions and contraindications. Patient/guardian/hospital staff has given consent to treat with full understanding of what to expect during the session. Before acupuncture began, provider explained to the patient to communicate at any time if the procedure was causing discomfort past their tolerance level. Patient agreed to advise acupuncturist. The acupuncturist counseled the patient on the risks of acupuncture treatment including pain, infection, bleeding, and no relief of pain. The patient was positioned comfortably. There was no evidence of infection at the site of needle insertions.    Objective   Physical Exam    Acupuncture Physical Exam  Tongue Shape: Long pointed  Tongue Coating: No coating, Dry    Treatment Plan  Treatment Goals: Pain management  Pattern Differentiation: FR iN LR channel, st qi deficeincy, oketsu, adrenal imbalance  Treatment Principle: Move qi and blood, regulate LR channel and adrenals    Acupuncture Treatment  Patient Position: Supine on a table  Acupuncture Needling: Yes  Needle Guage: 42 guage /.14/ Lime green seirin, 40 guage /.16/ Red seirin, 36 guage /.20/ Blue seirin  Body Points - Left: k7, kate 5, harsha hui, dplfcx2  Body Points - Bilateral: LR metal water, st qi x2, k27, baxie  Body Points - Right: st 24, k 6  Needle Count In: 23  Needle Count Out: 23  Needle Retention Time (min): 30 minutes  Total Face to  Face Time (min): 25 minutes              Assessment/Plan

## 2024-09-11 ENCOUNTER — APPOINTMENT (OUTPATIENT)
Dept: INTEGRATIVE MEDICINE | Facility: CLINIC | Age: 24
End: 2024-09-11
Payer: COMMERCIAL

## 2024-09-16 ENCOUNTER — APPOINTMENT (OUTPATIENT)
Dept: INTEGRATIVE MEDICINE | Facility: CLINIC | Age: 24
End: 2024-09-16
Payer: COMMERCIAL

## 2024-09-16 ENCOUNTER — ALLIED HEALTH (OUTPATIENT)
Dept: INTEGRATIVE MEDICINE | Facility: CLINIC | Age: 24
End: 2024-09-16
Payer: MEDICAID

## 2024-09-16 DIAGNOSIS — R11.0 NAUSEA: ICD-10-CM

## 2024-09-16 DIAGNOSIS — F32.A ANXIETY AND DEPRESSION: ICD-10-CM

## 2024-09-16 DIAGNOSIS — R10.9 STOMACH PAIN: ICD-10-CM

## 2024-09-16 DIAGNOSIS — M54.59 OTHER LOW BACK PAIN: Primary | ICD-10-CM

## 2024-09-16 DIAGNOSIS — F41.9 ANXIETY AND DEPRESSION: ICD-10-CM

## 2024-09-16 PROCEDURE — 97810 ACUP 1/> WO ESTIM 1ST 15 MIN: CPT | Performed by: ACUPUNCTURIST

## 2024-09-16 PROCEDURE — 97811 ACUP 1/> W/O ESTIM EA ADD 15: CPT | Performed by: ACUPUNCTURIST

## 2024-09-16 NOTE — PROGRESS NOTES
Dictation #1  MRN:[id number]  CSN:[id number] Acupuncture Visit:     Subjective   Patient ID: Hector Pearson is a 23 y.o. male who presents for No chief complaint on file.  Pt reported that he is doing better than last week.  Symptoms of low back pain, stomach pain, indigestion, and nausea still present but below baseline  Previous:  Pt reported that he has decreased stomach pain and low back pain.  He reported that nausea improved until yesterday and he currently has indigestions.          Session Information  Is this acupuncture treatment being billed to the patient's insurance company: Yes  This is visit number: 5  The patient has a total number of visits of: 30  Initial Acupuncture Treatment date: 08/21/24  Name of Insurance Company: [employer]  Name of Supervising Physician: BRYCE  Visit Type: Follow-up visit  Medical History Reviewed: I have reviewed pertinent medical history in EHR, and no contraindications are present to provide treatment         Review of Systems         Provider reviewed plan for the acupuncture session, precautions and contraindications. Patient/guardian/hospital staff has given consent to treat with full understanding of what to expect during the session. Before acupuncture began, provider explained to the patient to communicate at any time if the procedure was causing discomfort past their tolerance level. Patient agreed to advise acupuncturist. The acupuncturist counseled the patient on the risks of acupuncture treatment including pain, infection, bleeding, and no relief of pain. The patient was positioned comfortably. There was no evidence of infection at the site of needle insertions.    Objective   Physical Exam         Treatment Plan  Pattern Differentiation: oketsu, st qi deficiency adrenal imbalance  Treatment Principle: regulate adrenals, move qi and blood,    Acupuncture Treatment  Patient Position: Supine on a table  Needle Guage: 40 guage /.16/ Red seirin, 36 guage /.20/  Blue irin  Body Points - Left: kate 5, lr 4,  Body Points - Bilateral: rachealie  Body Points - Right: st 24, cv12, 6  Needle Count In: 19  Needle Count Out: 19  Needle Retention Time (min): 30 minutes  Total Face to Face Time (min): 25 minutes              Assessment/Plan

## 2024-09-18 ENCOUNTER — APPOINTMENT (OUTPATIENT)
Dept: INTEGRATIVE MEDICINE | Facility: CLINIC | Age: 24
End: 2024-09-18
Payer: MEDICAID

## 2024-09-20 ENCOUNTER — APPOINTMENT (OUTPATIENT)
Dept: INTEGRATIVE MEDICINE | Facility: CLINIC | Age: 24
End: 2024-09-20
Payer: COMMERCIAL

## 2024-09-23 ENCOUNTER — APPOINTMENT (OUTPATIENT)
Dept: INTEGRATIVE MEDICINE | Facility: CLINIC | Age: 24
End: 2024-09-23
Payer: COMMERCIAL

## 2024-09-25 ENCOUNTER — APPOINTMENT (OUTPATIENT)
Dept: INTEGRATIVE MEDICINE | Facility: CLINIC | Age: 24
End: 2024-09-25
Payer: MEDICAID

## 2024-09-25 DIAGNOSIS — F32.A ANXIETY AND DEPRESSION: ICD-10-CM

## 2024-09-25 DIAGNOSIS — R11.0 NAUSEA: ICD-10-CM

## 2024-09-25 DIAGNOSIS — M54.59 OTHER LOW BACK PAIN: Primary | ICD-10-CM

## 2024-09-25 DIAGNOSIS — F41.9 ANXIETY AND DEPRESSION: ICD-10-CM

## 2024-09-25 PROCEDURE — 97811 ACUP 1/> W/O ESTIM EA ADD 15: CPT | Performed by: ACUPUNCTURIST

## 2024-09-25 PROCEDURE — 97810 ACUP 1/> WO ESTIM 1ST 15 MIN: CPT | Performed by: ACUPUNCTURIST

## 2024-09-25 NOTE — PROGRESS NOTES
"Dictation #1  MRN:[id number]  CSN:[id number] Acupuncture Visit:     Subjective   Patient ID: Hector Pearson is a 23 y.o. male who presents for No chief complaint on file.  Pt reported that he is doing better than last week.  Symptoms of low back pain, stomach pain, indigestion, and nausea still present but below baseline  Feels like there is \"slow progress\" being made  Notes no changes in anxiety or depression    Previous:  Pt reported that he has decreased stomach pain and low back pain.  He reported that nausea improved until yesterday and he currently has indigestions.          Session Information  Is this acupuncture treatment being billed to the patient's insurance company: Yes  This is visit number: 6  The patient has a total number of visits of: 30  Initial Acupuncture Treatment date: 08/21/24  Name of Insurance Company: [employer]  Name of Supervising Physician: BRYCE  Visit Type: Follow-up visit  Description of present complaint: Chronic pain         Review of Systems         Provider reviewed plan for the acupuncture session, precautions and contraindications. Patient/guardian/hospital staff has given consent to treat with full understanding of what to expect during the session. Before acupuncture began, provider explained to the patient to communicate at any time if the procedure was causing discomfort past their tolerance level. Patient agreed to advise acupuncturist. The acupuncturist counseled the patient on the risks of acupuncture treatment including pain, infection, bleeding, and no relief of pain. The patient was positioned comfortably. There was no evidence of infection at the site of needle insertions.    Objective   Physical Exam              Acupuncture Treatment  Patient Position: Supine on a table  Acupuncture Needling: Yes  Needle Guage: 36 guage /.20/ Blue seirin  Body Points: With retention  Body Points - Bilateral: CV14.5 ST25 ST32 ST42  - YT - PC6  Auricular Points: Yes  Auricular " Points - Left: ST mouth PZ SM  Needle Count In: 14  Needle Count Out: 14  Needle Retention Time (min): 35 minutes  Total Face to Face Time (min): 25 minutes              Assessment/Plan

## 2024-09-26 ENCOUNTER — APPOINTMENT (OUTPATIENT)
Dept: INTEGRATIVE MEDICINE | Facility: CLINIC | Age: 24
End: 2024-09-26
Payer: MEDICAID

## 2024-09-27 ENCOUNTER — APPOINTMENT (OUTPATIENT)
Dept: INTEGRATIVE MEDICINE | Facility: CLINIC | Age: 24
End: 2024-09-27
Payer: COMMERCIAL

## 2024-09-30 ENCOUNTER — APPOINTMENT (OUTPATIENT)
Dept: INTEGRATIVE MEDICINE | Facility: CLINIC | Age: 24
End: 2024-09-30
Payer: COMMERCIAL

## 2024-09-30 ENCOUNTER — APPOINTMENT (OUTPATIENT)
Dept: INTEGRATIVE MEDICINE | Facility: CLINIC | Age: 24
End: 2024-09-30
Payer: MEDICAID

## 2024-09-30 DIAGNOSIS — R10.9 STOMACH PAIN: ICD-10-CM

## 2024-09-30 DIAGNOSIS — R11.0 NAUSEA: ICD-10-CM

## 2024-09-30 DIAGNOSIS — F41.9 ANXIETY AND DEPRESSION: ICD-10-CM

## 2024-09-30 DIAGNOSIS — M54.59 OTHER LOW BACK PAIN: Primary | ICD-10-CM

## 2024-09-30 DIAGNOSIS — F32.A ANXIETY AND DEPRESSION: ICD-10-CM

## 2024-09-30 PROCEDURE — 97811 ACUP 1/> W/O ESTIM EA ADD 15: CPT | Performed by: ACUPUNCTURIST

## 2024-09-30 PROCEDURE — 97810 ACUP 1/> WO ESTIM 1ST 15 MIN: CPT | Performed by: ACUPUNCTURIST

## 2024-09-30 NOTE — PROGRESS NOTES
"Dictation #1  MRN:[id number]  CSN:[id number] Acupuncture Visit:     Subjective   Patient ID: Hector Pearson is a 23 y.o. male who presents for No chief complaint on file.  Pt reported that he is doing better than last week.  Symptoms of low back pain, stomach pain, indigestion, and nausea still present but below baseline  Feels like there is \"slow progress\" being made  Notes no changes in anxiety or depression    Previous:  Pt reported that he has decreased stomach pain and low back pain.  He reported that nausea improved until yesterday and he currently has indigestions.          Session Information  Is this acupuncture treatment being billed to the patient's insurance company: Yes  This is visit number: 7  The patient has a total number of visits of: 30  Initial Acupuncture Treatment date: 08/21/24  Name of Insurance Company: [employer]  Name of Supervising Physician: BRYCE  Visit Type: Follow-up visit         Review of Systems         Provider reviewed plan for the acupuncture session, precautions and contraindications. Patient/guardian/hospital staff has given consent to treat with full understanding of what to expect during the session. Before acupuncture began, provider explained to the patient to communicate at any time if the procedure was causing discomfort past their tolerance level. Patient agreed to advise acupuncturist. The acupuncturist counseled the patient on the risks of acupuncture treatment including pain, infection, bleeding, and no relief of pain. The patient was positioned comfortably. There was no evidence of infection at the site of needle insertions.    Objective   Physical Exam              Acupuncture Treatment  Patient Position: Supine on a table  Acupuncture Needling: Yes  Needle Guage: 36 guage /.20/ Blue seirin  Body Points: With retention  Body Points - Bilateral: CV14.5 ST25 ST32 ST41 - PC6  Auricular Points: Yes  Auricular Points - Left: SM ST PZ mouth thalamus  Needle Count " In: 14  Needle Count Out: 14  Needle Retention Time (min): 35 minutes  Total Face to Face Time (min): 25 minutes              Assessment/Plan

## 2024-10-03 ENCOUNTER — APPOINTMENT (OUTPATIENT)
Dept: INTEGRATIVE MEDICINE | Facility: CLINIC | Age: 24
End: 2024-10-03
Payer: MEDICAID

## 2024-10-03 DIAGNOSIS — M54.59 OTHER LOW BACK PAIN: Primary | ICD-10-CM

## 2024-10-03 DIAGNOSIS — R11.0 NAUSEA: ICD-10-CM

## 2024-10-03 PROCEDURE — 97811 ACUP 1/> W/O ESTIM EA ADD 15: CPT | Performed by: ACUPUNCTURIST

## 2024-10-03 PROCEDURE — 97810 ACUP 1/> WO ESTIM 1ST 15 MIN: CPT | Performed by: ACUPUNCTURIST

## 2024-10-03 NOTE — PROGRESS NOTES
"Acupuncture Visit:     Subjective   Patient ID: Hector Pearson is a 23 y.o. male who presents for No chief complaint on file.  Pt reported that he went to see a naturopath who gave recommendations for supplementation with continued acupuncture and/or chiropractics.  He reported that emesis continues to be resolved, mood changes are intermittent with stomach pain and indigestion continuing to improve.  Previous:  Pt reported that he is doing better than last week.  Symptoms of low back pain, stomach pain, indigestion, and nausea still present but below baseline  Feels like there is \"slow progress\" being made  Notes no changes in anxiety or depression        Session Information  Is this acupuncture treatment being billed to the patient's insurance company: Yes  This is visit number: 8  The patient has a total number of visits of: 30  Initial Acupuncture Treatment date: 08/21/24  Name of Insurance Company: [employer]  Name of Supervising Physician: BRYCE  Visit Type: Follow-up visit  Description of present complaint: Chronic pain         Review of Systems         Provider reviewed plan for the acupuncture session, precautions and contraindications. Patient/guardian/hospital staff has given consent to treat with full understanding of what to expect during the session. Before acupuncture began, provider explained to the patient to communicate at any time if the procedure was causing discomfort past their tolerance level. Patient agreed to advise acupuncturist. The acupuncturist counseled the patient on the risks of acupuncture treatment including pain, infection, bleeding, and no relief of pain. The patient was positioned comfortably. There was no evidence of infection at the site of needle insertions.    Objective   Physical Exam         Treatment Plan  Treatment Goals: Pain management    Acupuncture Treatment  Needle Guage: 40 guage /.16/ Red seirin, 42 guage /.14/ Lime green seirin  Body Points - Left: dlpfcx2, " kate 5 lr4  Body Points - Bilateral: st qi x2, k 9, 27, li15, baxie  Needle Count In: 20  Needle Count Out: 20  Needle Retention Time (min): 30 minutes  Total Face to Face Time (min): 25 minutes              Assessment/Plan

## 2024-10-04 ENCOUNTER — PROCEDURE VISIT (OUTPATIENT)
Dept: GASTROENTEROLOGY | Facility: CLINIC | Age: 24
End: 2024-10-04
Payer: MEDICAID

## 2024-10-04 ENCOUNTER — APPOINTMENT (OUTPATIENT)
Dept: INTEGRATIVE MEDICINE | Facility: CLINIC | Age: 24
End: 2024-10-04
Payer: COMMERCIAL

## 2024-10-04 DIAGNOSIS — R63.4 ABNORMAL WEIGHT LOSS: ICD-10-CM

## 2024-10-04 DIAGNOSIS — R11.0 NAUSEA: ICD-10-CM

## 2024-10-04 DIAGNOSIS — R10.84 GENERALIZED ABDOMINAL PAIN: ICD-10-CM

## 2024-10-04 PROCEDURE — 91065 BREATH HYDROGEN/METHANE TEST: CPT

## 2024-10-04 PROCEDURE — 91065 BREATH HYDROGEN/METHANE TEST: CPT | Performed by: NURSE PRACTITIONER

## 2024-10-04 NOTE — PROGRESS NOTES
Patient ID: Hector Pearson is a 23 y.o. male.    Breath Hydrogen Test-Bacterial Overgrowth    Date/Time: 10/4/2024 12:59 PM    Performed by: Elizabeth Robles RN  Authorized by: Vasile Dunlap MD    Consent:     Consent obtained:  Verbal    Consent given by:  Patient  Universal protocol:     Procedure explained and questions answered to patient or proxy's satisfaction: yes      Patient identity confirmed:  Verbally with patient  Sedation:     Sedation type:  None  Anesthesia:     Anesthesia method:  None  Post-procedure details:     Procedure completion:  Tolerated  Hydrogen Breath Analysis Consultation Sheet    Referring Provider: Erlin Tello PA-C  [address] [facility] [address]  [address]  [place] [address]    Indication: R/O SIBO    Symptoms: Abnormal weight loss (R63.4); Nausea (R11.0), Generalized abdominal pain (R10.84)    Age: 23 y.o.  Weight: There were no vitals filed for this visit.  Substrate: Glucose   Dose: 75 gms    Last Meal: 10/3 19:00  Recent Antibiotics: Denies    RESULTS:   Time PPM (H2) APPM* (CH4) CO2 Correction   Baseline #1 9:08 8 3 3.6 1.52   Baseline #2 9:10 9 3 3.8 1.44   *Challenge Dose Sugar: 9:11  15' 9:30 9 3 3.8 1.44   30' 9:45 9 3 3.8 1.44   45' 10:00 7 3 3.9 1.41   60' 10:15 5 2 3.6 1.52   75' 10:30 4 1 3.9 1.41   90' 10:45 4 1 4.7 1.17   105'        120'        135'        150'        165'        180'          Impression: Negative for SIBO and methane

## 2024-10-07 ENCOUNTER — APPOINTMENT (OUTPATIENT)
Dept: INTEGRATIVE MEDICINE | Facility: CLINIC | Age: 24
End: 2024-10-07
Payer: COMMERCIAL

## 2024-10-07 ENCOUNTER — ALLIED HEALTH (OUTPATIENT)
Dept: INTEGRATIVE MEDICINE | Facility: CLINIC | Age: 24
End: 2024-10-07
Payer: MEDICAID

## 2024-10-07 ENCOUNTER — APPOINTMENT (OUTPATIENT)
Dept: INTEGRATIVE MEDICINE | Facility: CLINIC | Age: 24
End: 2024-10-07
Payer: MEDICAID

## 2024-10-07 DIAGNOSIS — R10.9 STOMACH PAIN: ICD-10-CM

## 2024-10-07 DIAGNOSIS — F41.9 ANXIETY AND DEPRESSION: ICD-10-CM

## 2024-10-07 DIAGNOSIS — M54.59 OTHER LOW BACK PAIN: Primary | ICD-10-CM

## 2024-10-07 DIAGNOSIS — R11.0 NAUSEA: ICD-10-CM

## 2024-10-07 DIAGNOSIS — F32.A ANXIETY AND DEPRESSION: ICD-10-CM

## 2024-10-07 PROCEDURE — 97810 ACUP 1/> WO ESTIM 1ST 15 MIN: CPT | Performed by: ACUPUNCTURIST

## 2024-10-07 PROCEDURE — 97811 ACUP 1/> W/O ESTIM EA ADD 15: CPT | Performed by: ACUPUNCTURIST

## 2024-10-07 NOTE — PROGRESS NOTES
"Acupuncture Visit:     Subjective   Patient ID: Hector Pearson is a 23 y.o. male who presents for No chief complaint on file.  Notes a headache for the past few days  Persistent, daily   Location: above eyebrows, behind eyes  Notes increase in nausea with headaches  Overall, reports that emesis continues to be resolved, mood changes are intermittent with stomach pain and indigestion continuing to improve.    Previous:  Pt reported that he is doing better than last week.  Symptoms of low back pain, stomach pain, indigestion, and nausea still present but below baseline  Feels like there is \"slow progress\" being made  Notes no changes in anxiety or depression          Session Information  Is this acupuncture treatment being billed to the patient's insurance company: Yes  This is visit number: 9  The patient has a total number of visits of: 30  Initial Acupuncture Treatment date: 08/21/24  Name of Insurance Company: [employer]  Name of Supervising Physician: BRYCE  Visit Type: Follow-up visit         Review of Systems         Provider reviewed plan for the acupuncture session, precautions and contraindications. Patient/guardian/hospital staff has given consent to treat with full understanding of what to expect during the session. Before acupuncture began, provider explained to the patient to communicate at any time if the procedure was causing discomfort past their tolerance level. Patient agreed to advise acupuncturist. The acupuncturist counseled the patient on the risks of acupuncture treatment including pain, infection, bleeding, and no relief of pain. The patient was positioned comfortably. There was no evidence of infection at the site of needle insertions.    Objective   Physical Exam              Acupuncture Treatment  Patient Position: Supine on a table  Acupuncture Needling: Yes  Body Points: With retention  Body Points - Bilateral: KI27 CV22 LI4 PC6 GB43 LR2  Auricular Points: Yes  Auricular Points - " Left: Mouth ST SM thalamus melba (5)  Needle Count In: 16  Needle Count Out: 16  Needle Retention Time (min): 35 minutes  Total Face to Face Time (min): 25 minutes              Assessment/Plan

## 2024-10-10 ENCOUNTER — APPOINTMENT (OUTPATIENT)
Dept: INTEGRATIVE MEDICINE | Facility: CLINIC | Age: 24
End: 2024-10-10
Payer: MEDICAID

## 2024-10-11 ENCOUNTER — APPOINTMENT (OUTPATIENT)
Dept: INTEGRATIVE MEDICINE | Facility: CLINIC | Age: 24
End: 2024-10-11
Payer: COMMERCIAL

## 2024-10-14 ENCOUNTER — APPOINTMENT (OUTPATIENT)
Dept: INTEGRATIVE MEDICINE | Facility: CLINIC | Age: 24
End: 2024-10-14
Payer: MEDICAID

## 2024-10-17 ENCOUNTER — APPOINTMENT (OUTPATIENT)
Dept: INTEGRATIVE MEDICINE | Facility: CLINIC | Age: 24
End: 2024-10-17
Payer: MEDICAID

## 2024-10-17 DIAGNOSIS — R11.0 NAUSEA: ICD-10-CM

## 2024-10-17 DIAGNOSIS — M54.59 OTHER LOW BACK PAIN: Primary | ICD-10-CM

## 2024-10-17 PROCEDURE — 97810 ACUP 1/> WO ESTIM 1ST 15 MIN: CPT | Performed by: ACUPUNCTURIST

## 2024-10-17 PROCEDURE — 97811 ACUP 1/> W/O ESTIM EA ADD 15: CPT | Performed by: ACUPUNCTURIST

## 2024-10-17 NOTE — PROGRESS NOTES
Acupuncture Visit:     Subjective   Patient ID: Hector Pearson is a 23 y.o. male who presents for No chief complaint on file.  Pt reported right sided h/a, less nausea and low back pain but still having bloating and indigestion.    Previous:  Location: above eyebrows, behind eyes  Notes increase in nausea with headaches  Overall, reports that emesis continues to be resolved, mood changes are intermittent with stomach pain and indigestion continuing to improve.        Session Information  Is this acupuncture treatment being billed to the patient's insurance company: Yes  This is visit number: 10  The patient has a total number of visits of: 30  Name of Insurance Company: [employer]  Visit Type: Follow-up visit         Review of Systems         Provider reviewed plan for the acupuncture session, precautions and contraindications. Patient/guardian/hospital staff has given consent to treat with full understanding of what to expect during the session. Before acupuncture began, provider explained to the patient to communicate at any time if the procedure was causing discomfort past their tolerance level. Patient agreed to advise acupuncturist. The acupuncturist counseled the patient on the risks of acupuncture treatment including pain, infection, bleeding, and no relief of pain. The patient was positioned comfortably. There was no evidence of infection at the site of needle insertions.    Objective   Physical Exam         Treatment Plan  Treatment Goals: Pain management    Acupuncture Treatment  Needle Guage: 36 guage /.20/ Blue seirin, 40 guage /.16/ Red seirin  Body Points - Left: lu5 lv4  Body Points - Bilateral: st qi x2, k 7, 10, 27, immunex2, gb21  Body Points - Right: gb2, sg8, sj 16, gb 12, 20  Needle Count In: 23  Needle Count Out: 23  Needle Retention Time (min): 30 minutes  Total Face to Face Time (min): 25 minutes              Assessment/Plan

## 2024-10-18 ENCOUNTER — APPOINTMENT (OUTPATIENT)
Dept: INTEGRATIVE MEDICINE | Facility: CLINIC | Age: 24
End: 2024-10-18
Payer: MEDICAID

## 2024-10-21 ENCOUNTER — APPOINTMENT (OUTPATIENT)
Dept: INTEGRATIVE MEDICINE | Facility: CLINIC | Age: 24
End: 2024-10-21
Payer: MEDICAID

## 2024-10-21 DIAGNOSIS — M54.59 OTHER LOW BACK PAIN: Primary | ICD-10-CM

## 2024-10-21 DIAGNOSIS — R11.0 NAUSEA: ICD-10-CM

## 2024-10-21 PROCEDURE — 97811 ACUP 1/> W/O ESTIM EA ADD 15: CPT | Performed by: ACUPUNCTURIST

## 2024-10-21 PROCEDURE — 97810 ACUP 1/> WO ESTIM 1ST 15 MIN: CPT | Performed by: ACUPUNCTURIST

## 2024-10-21 NOTE — PROGRESS NOTES
Acupuncture Visit:     Subjective   Patient ID: Hector Pearson is a 23 y.o. male who presents for No chief complaint on file.  Pt reported that right sided h/a has decreased by half.  Low back and indigestion have resolved but nausea is active.   Previous:  Pt reported right sided h/a, less nausea and low back pain but still having bloating and indigestion.    Previous:          Session Information  Is this acupuncture treatment being billed to the patient's insurance company: Yes  This is visit number: 11  The patient has a total number of visits of: 30  Initial Acupuncture Treatment date: 08/21/24  Name of Insurance Company: [employer]  Visit Type: Follow-up visit         Review of Systems         Provider reviewed plan for the acupuncture session, precautions and contraindications. Patient/guardian/hospital staff has given consent to treat with full understanding of what to expect during the session. Before acupuncture began, provider explained to the patient to communicate at any time if the procedure was causing discomfort past their tolerance level. Patient agreed to advise acupuncturist. The acupuncturist counseled the patient on the risks of acupuncture treatment including pain, infection, bleeding, and no relief of pain. The patient was positioned comfortably. There was no evidence of infection at the site of needle insertions.    Objective   Physical Exam         Treatment Plan  Pattern Differentiation: adrenal sugar imbalance, oketsu  Treatment Principle: move qi and blood, regualte sugar and adrenals    Acupuncture Treatment  Patient Position: Supine on a table  Needle Guage: 40 guage /.16/ Red seirin, 36 guage /.20/ Blue seirin  Body Points - Left: kate 5, lr 4, dlpfcx1  Body Points - Bilateral: k 9, li15, immunex2,  Body Points - Right: st 9, cv 14, 15, gb21, 20,  Needle Count In: 16  Needle Count Out: 16  Needle Retention Time (min): 30 minutes  Total Face to Face Time (min): 25 minutes               Assessment/Plan

## 2024-10-23 ENCOUNTER — APPOINTMENT (OUTPATIENT)
Dept: INTEGRATIVE MEDICINE | Facility: CLINIC | Age: 24
End: 2024-10-23
Payer: MEDICAID

## 2024-10-24 ENCOUNTER — APPOINTMENT (OUTPATIENT)
Dept: INTEGRATIVE MEDICINE | Facility: CLINIC | Age: 24
End: 2024-10-24
Payer: MEDICAID

## 2024-10-24 DIAGNOSIS — R11.0 NAUSEA: ICD-10-CM

## 2024-10-24 DIAGNOSIS — R51.9 HEADACHE: ICD-10-CM

## 2024-10-24 DIAGNOSIS — M54.59 OTHER LOW BACK PAIN: Primary | ICD-10-CM

## 2024-10-24 PROCEDURE — 97811 ACUP 1/> W/O ESTIM EA ADD 15: CPT | Performed by: ACUPUNCTURIST

## 2024-10-24 PROCEDURE — 97810 ACUP 1/> WO ESTIM 1ST 15 MIN: CPT | Performed by: ACUPUNCTURIST

## 2024-10-24 NOTE — PROGRESS NOTES
Acupuncture Visit:     Subjective   Patient ID: Hector Pearson is a 23 y.o. male who presents for No chief complaint on file.  Pt reported that symptoms have improved.  Right sided h/a is intermittent and occurs behind his eye.  He has active low back pain and nausea without any indigestion        Session Information  Is this acupuncture treatment being billed to the patient's insurance company: Yes  This is visit number: 12  The patient has a total number of visits of: 30  Initial Acupuncture Treatment date: 08/21/24  Name of Insurance Company: [employer] [facility]  Name of Supervising Physician: BRYCE  Visit Type: Follow-up visit  Medical History Reviewed: I have reviewed pertinent medical history in EHR, and no contraindications are present to provide treatment         Review of Systems         Provider reviewed plan for the acupuncture session, precautions and contraindications. Patient/guardian/hospital staff has given consent to treat with full understanding of what to expect during the session. Before acupuncture began, provider explained to the patient to communicate at any time if the procedure was causing discomfort past their tolerance level. Patient agreed to advise acupuncturist. The acupuncturist counseled the patient on the risks of acupuncture treatment including pain, infection, bleeding, and no relief of pain. The patient was positioned comfortably. There was no evidence of infection at the site of needle insertions.    Objective   Physical Exam         Treatment Plan  Treatment Goals: Pain management, Nausea reduction    Acupuncture Treatment  Needle Guage: 40 guage /.16/ Red michaellen  Body Points - Left: dlpfcx1, harsha hui,  Body Points - Bilateral: immunex2, st qi x2, baxie, sp 3.2, st 13, k 6, 27  Needle Count In: 22  Needle Count Out: 22  Needle Retention Time (min): 30 minutes  Total Face to Face Time (min): 25 minutes              Assessment/Plan

## 2024-10-28 ENCOUNTER — APPOINTMENT (OUTPATIENT)
Dept: INTEGRATIVE MEDICINE | Facility: CLINIC | Age: 24
End: 2024-10-28
Payer: MEDICAID

## 2024-10-30 ENCOUNTER — APPOINTMENT (OUTPATIENT)
Dept: INTEGRATIVE MEDICINE | Facility: CLINIC | Age: 24
End: 2024-10-30
Payer: MEDICAID

## 2024-10-30 DIAGNOSIS — R11.0 NAUSEA: ICD-10-CM

## 2024-10-30 DIAGNOSIS — M54.59 OTHER LOW BACK PAIN: Primary | ICD-10-CM

## 2024-10-30 DIAGNOSIS — R10.9 STOMACH PAIN: ICD-10-CM

## 2024-10-30 PROCEDURE — 97810 ACUP 1/> WO ESTIM 1ST 15 MIN: CPT | Performed by: ACUPUNCTURIST

## 2024-10-30 PROCEDURE — 97811 ACUP 1/> W/O ESTIM EA ADD 15: CPT | Performed by: ACUPUNCTURIST

## 2024-10-31 ENCOUNTER — APPOINTMENT (OUTPATIENT)
Dept: INTEGRATIVE MEDICINE | Facility: CLINIC | Age: 24
End: 2024-10-31
Payer: MEDICAID

## 2024-11-01 ENCOUNTER — APPOINTMENT (OUTPATIENT)
Dept: INTEGRATIVE MEDICINE | Facility: CLINIC | Age: 24
End: 2024-11-01
Payer: MEDICAID

## 2024-11-04 ENCOUNTER — APPOINTMENT (OUTPATIENT)
Dept: INTEGRATIVE MEDICINE | Facility: CLINIC | Age: 24
End: 2024-11-04
Payer: MEDICAID

## 2024-11-04 DIAGNOSIS — M54.59 OTHER LOW BACK PAIN: Primary | ICD-10-CM

## 2024-11-04 DIAGNOSIS — R11.0 NAUSEA: ICD-10-CM

## 2024-11-04 PROCEDURE — 97811 ACUP 1/> W/O ESTIM EA ADD 15: CPT | Performed by: ACUPUNCTURIST

## 2024-11-04 PROCEDURE — 97810 ACUP 1/> WO ESTIM 1ST 15 MIN: CPT | Performed by: ACUPUNCTURIST

## 2024-11-04 NOTE — PROGRESS NOTES
Acupuncture Visit:     Subjective   Patient ID: Hector Pearson is a 23 y.o. male who presents for No chief complaint on file.  Pt reported nausea, indigestion and other low back escalated last night and disrupted sleep.          Session Information  Is this acupuncture treatment being billed to the patient's insurance company: Yes  This is visit number: 14  The patient has a total number of visits of: 30  Initial Acupuncture Treatment date: 08/21/24  Name of Insurance Company: [employer] [address]  Name of Supervising Physician: BRYCE  Medical History Reviewed: I have reviewed pertinent medical history in EHR, and no contraindications are present to provide treatment         Review of Systems         Provider reviewed plan for the acupuncture session, precautions and contraindications. Patient/guardian/hospital staff has given consent to treat with full understanding of what to expect during the session. Before acupuncture began, provider explained to the patient to communicate at any time if the procedure was causing discomfort past their tolerance level. Patient agreed to advise acupuncturist. The acupuncturist counseled the patient on the risks of acupuncture treatment including pain, infection, bleeding, and no relief of pain. The patient was positioned comfortably. There was no evidence of infection at the site of needle insertions.    Objective   Physical Exam         Treatment Plan  Treatment Goals: Pain management    Acupuncture Treatment  Needle Guage: 36 guage /.20/ Blue seirin  Body Points: With retention  Body Points - Left: kate 5, lr4, cv15, 12  Body Points - Bilateral: k 9,27, li15, immunex2  Body Points - Right: sp 7, ht 3, p5  Needle Count In: 17  Needle Count Out: 17  Needle Retention Time (min): 30 minutes  Total Face to Face Time (min): 25 minutes              Assessment/Plan

## 2024-11-07 ENCOUNTER — APPOINTMENT (OUTPATIENT)
Dept: INTEGRATIVE MEDICINE | Facility: CLINIC | Age: 24
End: 2024-11-07
Payer: MEDICAID

## 2024-11-07 DIAGNOSIS — M54.59 OTHER LOW BACK PAIN: Primary | ICD-10-CM

## 2024-11-07 DIAGNOSIS — R11.0 NAUSEA: ICD-10-CM

## 2024-11-07 DIAGNOSIS — R51.9 ACUTE INTRACTABLE HEADACHE, UNSPECIFIED HEADACHE TYPE: ICD-10-CM

## 2024-11-07 DIAGNOSIS — R51.9 HEADACHE: ICD-10-CM

## 2024-11-07 PROCEDURE — 97810 ACUP 1/> WO ESTIM 1ST 15 MIN: CPT | Performed by: ACUPUNCTURIST

## 2024-11-07 PROCEDURE — 97811 ACUP 1/> W/O ESTIM EA ADD 15: CPT | Performed by: ACUPUNCTURIST

## 2024-11-08 ENCOUNTER — APPOINTMENT (OUTPATIENT)
Dept: INTEGRATIVE MEDICINE | Facility: CLINIC | Age: 24
End: 2024-11-08
Payer: MEDICAID

## 2024-11-11 ENCOUNTER — APPOINTMENT (OUTPATIENT)
Dept: INTEGRATIVE MEDICINE | Facility: CLINIC | Age: 24
End: 2024-11-11
Payer: MEDICAID

## 2024-11-11 DIAGNOSIS — R11.0 NAUSEA: ICD-10-CM

## 2024-11-11 DIAGNOSIS — M54.59 OTHER LOW BACK PAIN: Primary | ICD-10-CM

## 2024-11-11 PROCEDURE — 97810 ACUP 1/> WO ESTIM 1ST 15 MIN: CPT | Performed by: ACUPUNCTURIST

## 2024-11-11 PROCEDURE — 97811 ACUP 1/> W/O ESTIM EA ADD 15: CPT | Performed by: ACUPUNCTURIST

## 2024-11-11 NOTE — PROGRESS NOTES
Acupuncture Visit:     Subjective   Patient ID: Hector Pearson is a 23 y.o. male who presents for No chief complaint on file.  Pt reported that h/a now has reduced.  He attributes it to change in weather and  typically symptoms are allergy like and are resolved with cold weather.  Indigestion has reduced, nausea and low back pain has also reduced.  Previous:  Pt reported nausea today and R sided headache, back of head  Other low back pain, mild, related to abdominal pain radiating to his back  Notes headaches off and on for the past few weeks  Overall reduced abdominal pain, indigestion           Session Information  Is this acupuncture treatment being billed to the patient's insurance company: Yes  This is visit number: 16  The patient has a total number of visits of: 30  Initial Acupuncture Treatment date: 08/21/24  Name of Insurance Company: [employer]  Name of Supervising Physician: BRYCE  Visit Type: Follow-up visit         Review of Systems         Provider reviewed plan for the acupuncture session, precautions and contraindications. Patient/guardian/hospital staff has given consent to treat with full understanding of what to expect during the session. Before acupuncture began, provider explained to the patient to communicate at any time if the procedure was causing discomfort past their tolerance level. Patient agreed to advise acupuncturist. The acupuncturist counseled the patient on the risks of acupuncture treatment including pain, infection, bleeding, and no relief of pain. The patient was positioned comfortably. There was no evidence of infection at the site of needle insertions.    Objective   Physical Exam         Treatment Plan  Treatment Goals: Pain management, Nausea reduction  Pattern Differentiation: st qi deficiency, oketsu, adrenal imbalance  Treatment Principle: move qi and blood, regulate adrenals    Acupuncture Treatment  Patient Position: Supine on a table  Acupuncture Needling:  Yes  Needle Guage: 40 guage /.16/ Red seirin, 36 guage /.20/ Blue seirin  Body Points - Left: cv 15, 12, k 9, li 15  Body Points - Bilateral: k 27, st qi x2  Body Points - Right: gb21, k6  Needle Count In: 12  Needle Count Out: 12  Needle Retention Time (min): 25 minutes  Total Face to Face Time (min): 25 minutes              Assessment/Plan

## 2024-11-13 ENCOUNTER — APPOINTMENT (OUTPATIENT)
Dept: INTEGRATIVE MEDICINE | Facility: CLINIC | Age: 24
End: 2024-11-13
Payer: MEDICAID

## 2024-11-14 ENCOUNTER — APPOINTMENT (OUTPATIENT)
Dept: INTEGRATIVE MEDICINE | Facility: CLINIC | Age: 24
End: 2024-11-14
Payer: MEDICAID

## 2024-11-15 ENCOUNTER — APPOINTMENT (OUTPATIENT)
Dept: INTEGRATIVE MEDICINE | Facility: CLINIC | Age: 24
End: 2024-11-15
Payer: MEDICAID

## 2024-11-18 ENCOUNTER — APPOINTMENT (OUTPATIENT)
Dept: INTEGRATIVE MEDICINE | Facility: CLINIC | Age: 24
End: 2024-11-18
Payer: MEDICAID

## 2024-11-18 DIAGNOSIS — M54.59 OTHER LOW BACK PAIN: Primary | ICD-10-CM

## 2024-11-18 DIAGNOSIS — R10.9 STOMACH PAIN: ICD-10-CM

## 2024-11-18 DIAGNOSIS — R11.0 NAUSEA: ICD-10-CM

## 2024-11-18 PROCEDURE — 97810 ACUP 1/> WO ESTIM 1ST 15 MIN: CPT | Performed by: ACUPUNCTURIST

## 2024-11-18 PROCEDURE — 97811 ACUP 1/> W/O ESTIM EA ADD 15: CPT | Performed by: ACUPUNCTURIST

## 2024-11-18 NOTE — PROGRESS NOTES
Acupuncture Visit:     Subjective   Patient ID: Hector Pearson is a 23 y.o. male who presents for No chief complaint on file.  Indigestion - improved overall  Stomach/abdominal pain - improved overall  Nausea - still sits at a 5-6/10, constant   LBP- comes and goes, about 3-4/10 pain when it comes on  Headaches - figured out that his headaches are coming from medication, should be switching it soon, appointment this week to discuss with his Doctor      Prev  Pt reported that h/a now has reduced.  He attributes it to change in weather and  typically symptoms are allergy like and are resolved with cold weather.  Indigestion has reduced, nausea and low back pain has also reduced.    Previous:  Pt reported nausea today and R sided headache, back of head  Other low back pain, mild, related to abdominal pain radiating to his back  Notes headaches off and on for the past few weeks  Overall reduced abdominal pain, indigestion       Dictation #1  MRN:[id number]  CSN:[id number]     Session Information  Is this acupuncture treatment being billed to the patient's insurance company: Yes  This is visit number: 17  The patient has a total number of visits of: 30  Initial Acupuncture Treatment date: 08/21/24  Name of Insurance Company: [employer]  Name of Supervising Physician: NA  Visit Type: Follow-up visit  Description of present complaint: Chronic pain         Review of Systems         Provider reviewed plan for the acupuncture session, precautions and contraindications. Patient/guardian/hospital staff has given consent to treat with full understanding of what to expect during the session. Before acupuncture began, provider explained to the patient to communicate at any time if the procedure was causing discomfort past their tolerance level. Patient agreed to advise acupuncturist. The acupuncturist counseled the patient on the risks of acupuncture treatment including pain, infection, bleeding, and no relief of pain. The  patient was positioned comfortably. There was no evidence of infection at the site of needle insertions.    Objective   Physical Exam              Acupuncture Treatment  Patient Position: Supine on a table  Acupuncture Needling: Yes  Body Points: With retention  Body Points - Bilateral: YT LU7/KI6 SJ5/GB41 HT7 ST25 ST41  Auricular Points: Yes  Auricular Points - Left: SM ST PZ thymus nausea  Other Techniques Utilized: TDP Lamp  TDP Lamp Descripton: feet  Needle Count In: 20  Needle Count Out: 20  Needle Retention Time (min): 35 minutes  Total Face to Face Time (min): 25 minutes              Assessment/Plan

## 2024-11-20 ENCOUNTER — APPOINTMENT (OUTPATIENT)
Dept: INTEGRATIVE MEDICINE | Facility: CLINIC | Age: 24
End: 2024-11-20
Payer: MEDICAID

## 2024-11-21 ENCOUNTER — APPOINTMENT (OUTPATIENT)
Dept: INTEGRATIVE MEDICINE | Facility: CLINIC | Age: 24
End: 2024-11-21
Payer: MEDICAID

## 2024-11-25 ENCOUNTER — APPOINTMENT (OUTPATIENT)
Dept: INTEGRATIVE MEDICINE | Facility: CLINIC | Age: 24
End: 2024-11-25
Payer: MEDICAID

## 2024-11-25 DIAGNOSIS — M54.59 OTHER LOW BACK PAIN: Primary | ICD-10-CM

## 2024-11-25 DIAGNOSIS — R10.9 STOMACH PAIN: ICD-10-CM

## 2024-11-25 DIAGNOSIS — R51.9 CHRONIC INTRACTABLE HEADACHE, UNSPECIFIED HEADACHE TYPE: ICD-10-CM

## 2024-11-25 DIAGNOSIS — R11.0 NAUSEA: ICD-10-CM

## 2024-11-25 DIAGNOSIS — G89.29 CHRONIC INTRACTABLE HEADACHE, UNSPECIFIED HEADACHE TYPE: ICD-10-CM

## 2024-11-25 PROCEDURE — 97811 ACUP 1/> W/O ESTIM EA ADD 15: CPT | Performed by: ACUPUNCTURIST

## 2024-11-25 PROCEDURE — 97810 ACUP 1/> WO ESTIM 1ST 15 MIN: CPT | Performed by: ACUPUNCTURIST

## 2024-11-25 NOTE — PROGRESS NOTES
Acupuncture Visit:     Subjective   Patient ID: Hector Pearson is a 23 y.o. male who presents for No chief complaint on file.  Indigestion - improved overall  Stomach/abdominal pain - improved overall  Nausea - still sits at a 5-6/10, constant   LBP- comes and goes, about 3-4/10 pain when it comes on  Headaches - figured out that his headaches are coming from medication, should be switching it soon, appointment this week to discuss with his Doctor      Prev  Pt reported that h/a now has reduced.  He attributes it to change in weather and  typically symptoms are allergy like and are resolved with cold weather.  Indigestion has reduced, nausea and low back pain has also reduced.    Previous:  Pt reported nausea today and R sided headache, back of head  Other low back pain, mild, related to abdominal pain radiating to his back  Notes headaches off and on for the past few weeks  Overall reduced abdominal pain, indigestion       Dictation #1  MRN:[id number]  CSN:[id number]     Session Information  Is this acupuncture treatment being billed to the patient's insurance company: Yes  This is visit number: 18  The patient has a total number of visits of: 30  Initial Acupuncture Treatment date: 08/21/24  Name of Insurance Company: [employer]  Name of Supervising Physician: NA  Visit Type: Follow-up visit  Description of present complaint: Chronic pain         Review of Systems         Provider reviewed plan for the acupuncture session, precautions and contraindications. Patient/guardian/hospital staff has given consent to treat with full understanding of what to expect during the session. Before acupuncture began, provider explained to the patient to communicate at any time if the procedure was causing discomfort past their tolerance level. Patient agreed to advise acupuncturist. The acupuncturist counseled the patient on the risks of acupuncture treatment including pain, infection, bleeding, and no relief of pain. The  patient was positioned comfortably. There was no evidence of infection at the site of needle insertions.    Objective   Physical Exam              Acupuncture Treatment  Patient Position: Supine on a table  Acupuncture Needling: Yes  Needle Guage: 40 guage /.16/ Red seirin  Body Points: With retention  Body Points - Bilateral: CV14.5 ST25 LI4/LR3 KI6 SP6 GB43 GB21 YT DU20 PC6  Auricular Points: Yes  Auricular Points - Left: BFA 5 - nausea 1  Needle Count In: 26  Needle Count Out: 26  Needle Retention Time (min): 35 minutes  Total Face to Face Time (min): 25 minutes              Assessment/Plan

## 2024-12-02 ENCOUNTER — APPOINTMENT (OUTPATIENT)
Dept: INTEGRATIVE MEDICINE | Facility: CLINIC | Age: 24
End: 2024-12-02
Payer: MEDICAID

## 2024-12-04 ENCOUNTER — APPOINTMENT (OUTPATIENT)
Dept: INTEGRATIVE MEDICINE | Facility: CLINIC | Age: 24
End: 2024-12-04
Payer: MEDICAID

## 2024-12-05 ENCOUNTER — APPOINTMENT (OUTPATIENT)
Dept: INTEGRATIVE MEDICINE | Facility: CLINIC | Age: 24
End: 2024-12-05
Payer: MEDICAID

## 2024-12-09 ENCOUNTER — APPOINTMENT (OUTPATIENT)
Dept: INTEGRATIVE MEDICINE | Facility: CLINIC | Age: 24
End: 2024-12-09
Payer: MEDICAID

## 2024-12-09 DIAGNOSIS — R11.0 NAUSEA: ICD-10-CM

## 2024-12-09 DIAGNOSIS — R51.9 CHRONIC INTRACTABLE HEADACHE, UNSPECIFIED HEADACHE TYPE: ICD-10-CM

## 2024-12-09 DIAGNOSIS — M54.59 OTHER LOW BACK PAIN: Primary | ICD-10-CM

## 2024-12-09 DIAGNOSIS — R10.9 STOMACH PAIN: ICD-10-CM

## 2024-12-09 DIAGNOSIS — G89.29 CHRONIC INTRACTABLE HEADACHE, UNSPECIFIED HEADACHE TYPE: ICD-10-CM

## 2024-12-09 PROCEDURE — 97811 ACUP 1/> W/O ESTIM EA ADD 15: CPT | Performed by: ACUPUNCTURIST

## 2024-12-09 PROCEDURE — 97810 ACUP 1/> WO ESTIM 1ST 15 MIN: CPT | Performed by: ACUPUNCTURIST

## 2024-12-09 NOTE — PROGRESS NOTES
Acupuncture Visit:     Subjective   Patient ID: Hector Pearson is a 23 y.o. male who presents for No chief complaint on file.  Indigestion - improved overall  Stomach/abdominal pain - improved overall  Nausea -  sits at a 5-6/10   LBP- comes and goes, about 3-4/10 pain when it comes on, not bad  Headaches - still almost daily. Intense headache today, whole head     Prev  Pt reported that h/a now has reduced.  He attributes it to change in weather and  typically symptoms are allergy like and are resolved with cold weather.  Indigestion has reduced, nausea and low back pain has also reduced.    Previous:  Pt reported nausea today and R sided headache, back of head  Other low back pain, mild, related to abdominal pain radiating to his back  Notes headaches off and on for the past few weeks  Overall reduced abdominal pain, indigestion       Dictation #1  MRN:[id number]  CSN:[id number]     Session Information  Is this acupuncture treatment being billed to the patient's insurance company: Yes  This is visit number: 19  The patient has a total number of visits of: 30  Initial Acupuncture Treatment date: 08/21/24  Name of Insurance Company: [employer]  Name of Supervising Physician: NA  Visit Type: Follow-up visit  Description of present complaint: Chronic pain         Review of Systems         Provider reviewed plan for the acupuncture session, precautions and contraindications. Patient/guardian/hospital staff has given consent to treat with full understanding of what to expect during the session. Before acupuncture began, provider explained to the patient to communicate at any time if the procedure was causing discomfort past their tolerance level. Patient agreed to advise acupuncturist. The acupuncturist counseled the patient on the risks of acupuncture treatment including pain, infection, bleeding, and no relief of pain. The patient was positioned comfortably. There was no evidence of infection at the site of  needle insertions.    Objective   Physical Exam              Acupuncture Treatment  Patient Position: Supine on a table  Acupuncture Needling: Yes  Needle Guage: 40 guage /.16/ Red seirin  Body Points: With retention  Body Points - Bilateral: YT GB21 DU20 2 melba CV14.5 CV12 PC6 KI6 SP6 GB40 GB43  Needle Count In: 18  Needle Count Out: 18  Needle Retention Time (min): 35 minutes  Total Face to Face Time (min): 25 minutes              Assessment/Plan

## 2024-12-12 ENCOUNTER — APPOINTMENT (OUTPATIENT)
Dept: INTEGRATIVE MEDICINE | Facility: CLINIC | Age: 24
End: 2024-12-12
Payer: MEDICAID

## 2024-12-12 DIAGNOSIS — R11.0 NAUSEA: ICD-10-CM

## 2024-12-12 DIAGNOSIS — M54.59 OTHER LOW BACK PAIN: Primary | ICD-10-CM

## 2024-12-12 PROCEDURE — 97811 ACUP 1/> W/O ESTIM EA ADD 15: CPT | Performed by: ACUPUNCTURIST

## 2024-12-12 PROCEDURE — 97810 ACUP 1/> WO ESTIM 1ST 15 MIN: CPT | Performed by: ACUPUNCTURIST

## 2024-12-12 NOTE — PROGRESS NOTES
Acupuncture Visit:     Subjective   Patient ID: Hector Pearson is a 23 y.o. male who presents for No chief complaint on file.  PT reported that he has been feeling better with each week.  Today he is experiencing acid reflex, is without headache, reduced nausea and reduced low back pain        Session Information  Is this acupuncture treatment being billed to the patient's insurance company: Yes  This is visit number: 20  The patient has a total number of visits of: 30  Initial Acupuncture Treatment date: 08/21/24  Name of Insurance Company: [employer] [facility]  Name of Supervising Physician: BRYCE  Visit Type: Follow-up visit         Review of Systems         Provider reviewed plan for the acupuncture session, precautions and contraindications. Patient/guardian/hospital staff has given consent to treat with full understanding of what to expect during the session. Before acupuncture began, provider explained to the patient to communicate at any time if the procedure was causing discomfort past their tolerance level. Patient agreed to advise acupuncturist. The acupuncturist counseled the patient on the risks of acupuncture treatment including pain, infection, bleeding, and no relief of pain. The patient was positioned comfortably. There was no evidence of infection at the site of needle insertions.    Objective   Physical Exam         Treatment Plan  Treatment Goals: Nausea reduction, Pain management    Acupuncture Treatment  Patient Position: Supine on a table  Acupuncture Needling: Yes  Needle Guage: 40 guage /.16/ Red seirin, 36 guage /.20/ Blue seirin  Body Points - Left: kate 5, lr 4, cv 15, 17, 4  Body Points - Bilateral: k 9, 27, li 15, immunex1, st qi x2, st 25  Body Points - Right: st 8  Needle Count In: 19  Needle Count Out: 19  Needle Retention Time (min): 25 minutes  Total Face to Face Time (min): 25 minutes              Assessment/Plan

## 2024-12-16 ENCOUNTER — APPOINTMENT (OUTPATIENT)
Dept: INTEGRATIVE MEDICINE | Facility: CLINIC | Age: 24
End: 2024-12-16
Payer: MEDICAID

## 2024-12-16 ENCOUNTER — ALLIED HEALTH (OUTPATIENT)
Dept: INTEGRATIVE MEDICINE | Facility: CLINIC | Age: 24
End: 2024-12-16
Payer: MEDICAID

## 2024-12-16 DIAGNOSIS — R51.9 HEADACHE: ICD-10-CM

## 2024-12-16 DIAGNOSIS — M54.59 OTHER LOW BACK PAIN: Primary | ICD-10-CM

## 2024-12-16 DIAGNOSIS — R11.0 NAUSEA: ICD-10-CM

## 2024-12-16 PROCEDURE — 97810 ACUP 1/> WO ESTIM 1ST 15 MIN: CPT | Performed by: ACUPUNCTURIST

## 2024-12-16 PROCEDURE — 97811 ACUP 1/> W/O ESTIM EA ADD 15: CPT | Performed by: ACUPUNCTURIST

## 2024-12-16 NOTE — PROGRESS NOTES
Acupuncture Visit:     Subjective   Patient ID: Hector Pearson is a 23 y.o. male who presents for No chief complaint on file.  PT reported that his h/a has been resolved since last tx.  Nausea and acid reflux are present but remain reduced.  Low back is also improved.        Session Information  Is this acupuncture treatment being billed to the patient's insurance company: Yes  This is visit number: 21  The patient has a total number of visits of: 30  Initial Acupuncture Treatment date: 08/21/24  Name of Insurance Company: [employer] [facility]  Name of Supervising Physician: BRYCE  Visit Type: Follow-up visit  Medical History Reviewed: I have reviewed pertinent medical history in EHR, and no contraindications are present to provide treatment         Review of Systems         Provider reviewed plan for the acupuncture session, precautions and contraindications. Patient/guardian/hospital staff has given consent to treat with full understanding of what to expect during the session. Before acupuncture began, provider explained to the patient to communicate at any time if the procedure was causing discomfort past their tolerance level. Patient agreed to advise acupuncturist. The acupuncturist counseled the patient on the risks of acupuncture treatment including pain, infection, bleeding, and no relief of pain. The patient was positioned comfortably. There was no evidence of infection at the site of needle insertions.    Objective   Physical Exam         Treatment Plan  Treatment Goals: Pain management, Nausea reduction    Acupuncture Treatment  Patient Position: Supine on a table  Needle Guage: 40 guage /.16/ Red seirin, 36 guage /.20/ Blue seirin  Body Points - Left: kate 5, lr 8  Body Points - Bilateral: k 9, 27, li 15, immunex2,st 8, sp 6  Other Techniques Utilized: TDP Lamp  TDP Lamp Descripton: spencer  Needle Count In: 16  Needle Count Out: 16  Needle Retention Time (min): 25 minutes  Total Face to Face Time (min): 25  minutes              Assessment/Plan

## 2024-12-19 ENCOUNTER — APPOINTMENT (OUTPATIENT)
Dept: INTEGRATIVE MEDICINE | Facility: CLINIC | Age: 24
End: 2024-12-19
Payer: MEDICAID

## 2024-12-19 DIAGNOSIS — R11.0 NAUSEA: ICD-10-CM

## 2024-12-19 DIAGNOSIS — M54.59 OTHER LOW BACK PAIN: Primary | ICD-10-CM

## 2024-12-19 PROCEDURE — 97811 ACUP 1/> W/O ESTIM EA ADD 15: CPT | Performed by: ACUPUNCTURIST

## 2024-12-19 PROCEDURE — 97810 ACUP 1/> WO ESTIM 1ST 15 MIN: CPT | Performed by: ACUPUNCTURIST

## 2024-12-19 NOTE — PROGRESS NOTES
Acupuncture Visit:     Subjective   Patient ID: Hector Pearson is a 23 y.o. male who presents for No chief complaint on file.  Pt reported that nausea has been ok, it continues to improve slowly.  He has been having difficulty sleeping manifesting as inability to stay asleep.  He wakes 3x for a duration of 15 minutes.  Decrease in sleep increases low back pain        Session Information  Is this acupuncture treatment being billed to the patient's insurance company: Yes  This is visit number: 22  The patient has a total number of visits of: 30  Initial Acupuncture Treatment date: 08/21/24  Name of Insurance Company: [employer]  Name of Supervising Physician: NA  Visit Type: Follow-up visit         Review of Systems         Provider reviewed plan for the acupuncture session, precautions and contraindications. Patient/guardian/hospital staff has given consent to treat with full understanding of what to expect during the session. Before acupuncture began, provider explained to the patient to communicate at any time if the procedure was causing discomfort past their tolerance level. Patient agreed to advise acupuncturist. The acupuncturist counseled the patient on the risks of acupuncture treatment including pain, infection, bleeding, and no relief of pain. The patient was positioned comfortably. There was no evidence of infection at the site of needle insertions.    Objective   Physical Exam         Treatment Plan  Treatment Goals: Nausea reduction, Pain management    Acupuncture Treatment  Patient Position: Supine on a table  Needle Guage: 40 guage /.16/ Red seirin  Body Points - Left: harsha hui, k 7, 10, 27, dlpfcx2  Body Points - Bilateral: st qix2,  Body Points - Right: st 24, k 9, 27, li15  Other Techniques Utilized: TDP Lamp  TDP Lamp Descripton: spencer  Needle Count In: 14  Needle Count Out: 14  Needle Retention Time (min): 25 minutes  Total Face to Face Time (min): 25 minutes              Assessment/Plan

## 2024-12-26 ENCOUNTER — ALLIED HEALTH (OUTPATIENT)
Dept: INTEGRATIVE MEDICINE | Facility: CLINIC | Age: 24
End: 2024-12-26
Payer: MEDICAID

## 2024-12-26 ENCOUNTER — APPOINTMENT (OUTPATIENT)
Dept: INTEGRATIVE MEDICINE | Facility: CLINIC | Age: 24
End: 2024-12-26
Payer: MEDICAID

## 2024-12-26 DIAGNOSIS — G47.00 INSOMNIA: ICD-10-CM

## 2024-12-26 DIAGNOSIS — R11.0 NAUSEA: ICD-10-CM

## 2024-12-26 DIAGNOSIS — M54.59 OTHER LOW BACK PAIN: Primary | ICD-10-CM

## 2024-12-26 PROCEDURE — 97811 ACUP 1/> W/O ESTIM EA ADD 15: CPT | Performed by: ACUPUNCTURIST

## 2024-12-26 PROCEDURE — 97810 ACUP 1/> WO ESTIM 1ST 15 MIN: CPT | Performed by: ACUPUNCTURIST

## 2024-12-26 NOTE — PROGRESS NOTES
Acupuncture Visit:     Subjective   Patient ID: Hector Pearson is a 23 y.o. male who presents for No chief complaint on file.  Pt reported that low back discomfort is present.  Insomnia has increased but gains with nausea have sustained        Session Information  Is this acupuncture treatment being billed to the patient's insurance company: Yes  This is visit number: 23  The patient has a total number of visits of: 30  Initial Acupuncture Treatment date: 08/21/24  Name of Supervising Physician: NA  Visit Type: Follow-up visit         Review of Systems         Provider reviewed plan for the acupuncture session, precautions and contraindications. Patient/guardian/hospital staff has given consent to treat with full understanding of what to expect during the session. Before acupuncture began, provider explained to the patient to communicate at any time if the procedure was causing discomfort past their tolerance level. Patient agreed to advise acupuncturist. The acupuncturist counseled the patient on the risks of acupuncture treatment including pain, infection, bleeding, and no relief of pain. The patient was positioned comfortably. There was no evidence of infection at the site of needle insertions.    Objective   Physical Exam              Acupuncture Treatment  Patient Position: Supine on a table  Needle Guage: 40 guage /.16/ Red seirin  Body Points - Left: cv 15, 14, 12, immunex2  Body Points - Bilateral: k 9, 27, li 15,  Body Points - Right: yin marvin, buddha triangle  Needle Count In: 15  Needle Count Out: 15  Needle Retention Time (min): 30 minutes  Total Face to Face Time (min): 25 minutes              Assessment/Plan

## 2024-12-30 ENCOUNTER — APPOINTMENT (OUTPATIENT)
Dept: INTEGRATIVE MEDICINE | Facility: CLINIC | Age: 24
End: 2024-12-30
Payer: MEDICAID

## 2025-01-02 ENCOUNTER — APPOINTMENT (OUTPATIENT)
Dept: INTEGRATIVE MEDICINE | Facility: CLINIC | Age: 25
End: 2025-01-02
Payer: MEDICAID

## 2025-01-08 ENCOUNTER — APPOINTMENT (OUTPATIENT)
Dept: INTEGRATIVE MEDICINE | Facility: CLINIC | Age: 25
End: 2025-01-08
Payer: MEDICAID

## 2025-01-08 DIAGNOSIS — R11.0 NAUSEA: ICD-10-CM

## 2025-01-08 DIAGNOSIS — M54.59 OTHER LOW BACK PAIN: Primary | ICD-10-CM

## 2025-01-08 PROCEDURE — 97810 ACUP 1/> WO ESTIM 1ST 15 MIN: CPT | Performed by: ACUPUNCTURIST

## 2025-01-08 PROCEDURE — 97811 ACUP 1/> W/O ESTIM EA ADD 15: CPT | Performed by: ACUPUNCTURIST

## 2025-01-08 NOTE — PROGRESS NOTES
Acupuncture Visit:     Subjective   Patient ID: Hector Pearson is a 24 y.o. male who presents for No chief complaint on file.  PT reported that back has been doing well.  Nausea has been ok with fluctuating intensity.  Emesis remains resolved.  He wakes 2-3x/ night for 5-10 minutes.          Session Information  Is this acupuncture treatment being billed to the patient's insurance company: Yes  This is visit number: 1  The patient has a total number of visits of: 30  Name of Insurance Company: [facility]  Name of Supervising Physician: BRYCE  Visit Type: Follow-up visit  Medical History Reviewed: I have reviewed pertinent medical history in EHR, and no contraindications are present to provide treatment         Review of Systems         Provider reviewed plan for the acupuncture session, precautions and contraindications. Patient/guardian/hospital staff has given consent to treat with full understanding of what to expect during the session. Before acupuncture began, provider explained to the patient to communicate at any time if the procedure was causing discomfort past their tolerance level. Patient agreed to advise acupuncturist. The acupuncturist counseled the patient on the risks of acupuncture treatment including pain, infection, bleeding, and no relief of pain. The patient was positioned comfortably. There was no evidence of infection at the site of needle insertions.    Objective   Physical Exam         Treatment Plan  Treatment Goals: Pain management, Nausea reduction    Acupuncture Treatment  Patient Position: Supine on a table  Needle Guage: 36 guage /.20/ Blue seirin  Body Points - Left: kate 5, lr 4  Body Points - Bilateral: immunex2, k 6, 27, cv 4, st 25, cv 14, 12  Other Techniques Utilized: TDP Lamp  TDP Lamp Descripton: spencre  Needle Count In: 14  Needle Count Out: 14  Needle Retention Time (min): 25 minutes  Total Face to Face Time (min): 25 minutes              Assessment/Plan

## 2025-01-09 ENCOUNTER — APPOINTMENT (OUTPATIENT)
Dept: INTEGRATIVE MEDICINE | Facility: CLINIC | Age: 25
End: 2025-01-09
Payer: MEDICAID

## 2025-01-16 ENCOUNTER — APPOINTMENT (OUTPATIENT)
Dept: INTEGRATIVE MEDICINE | Facility: CLINIC | Age: 25
End: 2025-01-16
Payer: MEDICAID

## 2025-01-16 DIAGNOSIS — M54.59 OTHER LOW BACK PAIN: Primary | ICD-10-CM

## 2025-01-16 DIAGNOSIS — R11.0 NAUSEA: ICD-10-CM

## 2025-01-16 PROCEDURE — 97811 ACUP 1/> W/O ESTIM EA ADD 15: CPT | Performed by: ACUPUNCTURIST

## 2025-01-16 PROCEDURE — 97810 ACUP 1/> WO ESTIM 1ST 15 MIN: CPT | Performed by: ACUPUNCTURIST

## 2025-01-16 NOTE — PROGRESS NOTES
Acupuncture Visit:     Subjective   Patient ID: Hector Pearson is a 24 y.o. male who presents for No chief complaint on file.  Pt reported that his low back has increased in tightness and less mobile with bending forward being the most restrictive.  Indigestion is also increase.  He also has active h/a (vertex) related to poor sleep.        Session Information  Is this acupuncture treatment being billed to the patient's insurance company: Yes  This is visit number: 2  The patient has a total number of visits of: 30  Initial Acupuncture Treatment date: 08/21/24  Name of Insurance Company: [employer]  Name of Supervising Physician: BRYCE  Visit Type: Follow-up visit         Review of Systems         Provider reviewed plan for the acupuncture session, precautions and contraindications. Patient/guardian/hospital staff has given consent to treat with full understanding of what to expect during the session. Before acupuncture began, provider explained to the patient to communicate at any time if the procedure was causing discomfort past their tolerance level. Patient agreed to advise acupuncturist. The acupuncturist counseled the patient on the risks of acupuncture treatment including pain, infection, bleeding, and no relief of pain. The patient was positioned comfortably. There was no evidence of infection at the site of needle insertions.    Objective   Physical Exam              Acupuncture Treatment  Body Points - Left: li 4, ub 62  Body Points - Bilateral: st 36, sp 6, lr 13, 14, cv 12, 6, yin marvin  Body Points - Right: si3, lr 3  Needle Count In: 15  Needle Count Out: 15  Needle Retention Time (min): 25 minutes  Total Face to Face Time (min): 25 minutes              Assessment/Plan

## 2025-01-23 ENCOUNTER — APPOINTMENT (OUTPATIENT)
Dept: INTEGRATIVE MEDICINE | Facility: CLINIC | Age: 25
End: 2025-01-23
Payer: MEDICAID

## 2025-01-27 ENCOUNTER — APPOINTMENT (OUTPATIENT)
Dept: INTEGRATIVE MEDICINE | Facility: CLINIC | Age: 25
End: 2025-01-27
Payer: MEDICAID

## 2025-01-30 ENCOUNTER — APPOINTMENT (OUTPATIENT)
Dept: INTEGRATIVE MEDICINE | Facility: CLINIC | Age: 25
End: 2025-01-30
Payer: MEDICAID

## 2025-01-30 DIAGNOSIS — R11.0 NAUSEA: Primary | ICD-10-CM

## 2025-01-30 DIAGNOSIS — M54.59 OTHER LOW BACK PAIN: ICD-10-CM

## 2025-01-30 PROCEDURE — 97811 ACUP 1/> W/O ESTIM EA ADD 15: CPT | Performed by: ACUPUNCTURIST

## 2025-01-30 PROCEDURE — 97810 ACUP 1/> WO ESTIM 1ST 15 MIN: CPT | Performed by: ACUPUNCTURIST

## 2025-01-30 NOTE — PROGRESS NOTES
Acupuncture Visit:     Subjective   Patient ID: Hector Pearson is a 24 y.o. male who presents for No chief complaint on file.  Pt reported that back is doing ok.  He has been exercising using his body weight.  Back is tight and sore.  Indigestion has improved and more intermittent as well as with the h/a.  Nausea has also improved but still present.        Session Information  Is this acupuncture treatment being billed to the patient's insurance company: Yes  This is visit number: 3  The patient has a total number of visits of: 30  Name of Insurance Company: [employer]  Visit Type: Follow-up visit         Review of Systems         Provider reviewed plan for the acupuncture session, precautions and contraindications. Patient/guardian/hospital staff has given consent to treat with full understanding of what to expect during the session. Before acupuncture began, provider explained to the patient to communicate at any time if the procedure was causing discomfort past their tolerance level. Patient agreed to advise acupuncturist. The acupuncturist counseled the patient on the risks of acupuncture treatment including pain, infection, bleeding, and no relief of pain. The patient was positioned comfortably. There was no evidence of infection at the site of needle insertions.    Objective   Physical Exam         Treatment Plan  Treatment Goals: Pain management    Acupuncture Treatment  Patient Position: Supine on a table  Needle Guage: 40 guage /.16/ Red seirin, 36 guage /.20/ Blue seirin  Body Points - Left: sj 5, lr 3  Body Points - Bilateral: immune, k 6, 27, cv 14, 12, 4 st 25  Body Points - Right: gb 41, li 4  Needle Count In: 14  Needle Count Out: 14  Needle Retention Time (min): 25 minutes  Total Face to Face Time (min): 25 minutes              Assessment/Plan

## 2025-02-06 ENCOUNTER — APPOINTMENT (OUTPATIENT)
Dept: INTEGRATIVE MEDICINE | Facility: CLINIC | Age: 25
End: 2025-02-06
Payer: MEDICAID

## 2025-02-06 DIAGNOSIS — M54.59 OTHER LOW BACK PAIN: Primary | ICD-10-CM

## 2025-02-06 DIAGNOSIS — R11.0 NAUSEA: ICD-10-CM

## 2025-02-06 PROCEDURE — 97811 ACUP 1/> W/O ESTIM EA ADD 15: CPT | Performed by: ACUPUNCTURIST

## 2025-02-06 PROCEDURE — 97810 ACUP 1/> WO ESTIM 1ST 15 MIN: CPT | Performed by: ACUPUNCTURIST

## 2025-02-06 NOTE — PROGRESS NOTES
Acupuncture Visit:     Subjective   Patient ID: Hector Pearson is a 24 y.o. male who presents for No chief complaint on file.  PT reported that nausea and back are very bothersome.  Described as mostly constant radiating in low back as band across as low back.  Nausea has also escalated.          Session Information  Is this acupuncture treatment being billed to the patient's insurance company: Yes  This is visit number: 4  The patient has a total number of visits of: 30  Initial Acupuncture Treatment date: 08/21/24  Name of Insurance Company: [facility] [facility]  Visit Type: Follow-up visit         Review of Systems         Provider reviewed plan for the acupuncture session, precautions and contraindications. Patient/guardian/hospital staff has given consent to treat with full understanding of what to expect during the session. Before acupuncture began, provider explained to the patient to communicate at any time if the procedure was causing discomfort past their tolerance level. Patient agreed to advise acupuncturist. The acupuncturist counseled the patient on the risks of acupuncture treatment including pain, infection, bleeding, and no relief of pain. The patient was positioned comfortably. There was no evidence of infection at the site of needle insertions.    Objective   Physical Exam         Treatment Plan  Treatment Goals: Pain management, Nausea reduction    Acupuncture Treatment  Patient Position: Supine on a table  Acupuncture Needling: Yes  Needle Guage: 42 guage /.14/ Lime green seirin, 40 guage /.16/ Red seirin  Body Points - Left: p6, gb 41  Body Points - Bilateral: st 41, 25, cv 15  Body Points - Right: sj 5, sp 4  Other Techniques Utilized: Acupressure  Acupressure Description: st 32  Needle Count In: 9  Needle Count Out: 9  Needle Retention Time (min): 30 minutes  Total Face to Face Time (min): 25 minutes              Assessment/Plan        Hemorragia nasal  (Nosebleed)  Las hemorragias nasales son frecuentes Se deben a larissa grieta en la membrana interna de la nariz (membrana mucosa) o al sangrado de un pequeño vaso sanguíneo. Las hemorragias nasales pueden tener doherty origen en numerosos trastornos, por ejemplo, lesiones, infecciones, sequedad de las membranas mucosas o clima seco, medicamentos, hurgarse la nariz y los sistemas hogareños de calefacción y refrigeración. La mayoría de las hemorragias nasales provienen de los vasos sanguíneos de la parte anterior de la nariz.  INSTRUCCIONES PARA EL CUIDADO EN EL HOGAR   · Para tratar de controlar la hemorragia nasal, oprímase las fosas nasales de manera suave y continua davon por lo menos 10 minutos.  · No se suene ni resople por la nariz davon varias horas después de tay tenido larissa hemorragia nasal.  · No se coloque usted mismo gasa dentro de la nariz. Si el médico le taponó la nariz con larissa compresa, trate de dejársela puesta hasta que el profesional se la retire.  ¨ Si le colocaron un tapón de gasa y rose mary comienza a salirse, reemplácelo con cuidado por otro o córtele el extremo.  ¨ Si se usó un catéter con balón para taponarle la nariz, no lo kyara ni lo retire a menos que el médico le haya indicado hacerlo.  · No se acueste mientras tiene larissa hemorragia nasal. Siéntese e inclínese hacia agnieszka.  · Use un aerosol nasal descongestivo para aliviar larissa hemorragia nasal deepa se lo haya indicado el médico.  · No se ponga vaselina ni aceite de vaselina en la nariz. Estos productos pueden gotear dentro de los pulmones.  · Mantenga la humedad en doherty casa; para ello, use menos el aire acondicionado o utilice un humidificador.  · La aspirina y los anticoagulantes aumentan la probabilidad de hemorragias. Si le recetan estos medicamentos y tiene hemorragias nasales, consúltele al médico si debe dejar de tomarlos o ajustar la dosis. No deje de reyes los medicamentos, a menos que se lo haya indicado el  médico.  · Retome hillary actividades normales cuando pueda, jace intente no hacer esfuerzos, no levantar pesos y no doblar la cintura para agacharse davon varios días.  · Si la hemorragia nasal se debe a la sequedad de las membranas mucosas, use un gel o aerosol nasal de solución salina de venta clarissa. [place] mantendrá las mucosas húmedas y permitirá que se cicatricen. Si debe usar un lubricante, elija los que myranda solubles en agua. Úselos de forma ocasional y no los use después de varias horas de estar acostado.  · Concurra a todas las visitas de control deepa se lo haya indicado el médico. [place] es importante.  SOLICITE ATENCIÓN MÉDICA SI:  · Tiene fiebre.  · Tiene hemorragias nasales con frecuencia.  · Tiene hemorragias nasales cada vez más seguido.  SOLICITE ATENCIÓN MÉDICA DE INMEDIATO SI:  · La hemorragia nasal dura más de 20 minutos.  · La hemorragia nasal ocurre después de larissa lesión en la tremaine, y la nariz parece estar torcida o fracturada.  · Tiene hemorragias fuera de lo común en otras partes del cuerpo.  · Tiene hematomas fuera de lo común en otras partes del cuerpo.  · Se siente mareado o se desmaya.  · Tiene sudoración.  · Vomita aaliyah.  · La hemorragia nasal ocurre después de larissa lesión en la giancarlo.     Esta información no tiene deepa fin reemplazar el consejo del médico. Asegúrese de hacerle al médico cualquier pregunta que tenga.     Document Released: 09/27/2006 Document Revised: 01/08/2016  Elsevier Interactive Patient Education ©2016 [employer] Inc[employer]

## 2025-02-20 ENCOUNTER — APPOINTMENT (OUTPATIENT)
Dept: INTEGRATIVE MEDICINE | Facility: CLINIC | Age: 25
End: 2025-02-20
Payer: MEDICAID

## 2025-03-06 ENCOUNTER — APPOINTMENT (OUTPATIENT)
Dept: INTEGRATIVE MEDICINE | Facility: CLINIC | Age: 25
End: 2025-03-06
Payer: MEDICAID

## 2025-03-06 DIAGNOSIS — R11.0 NAUSEA: ICD-10-CM

## 2025-03-06 DIAGNOSIS — M54.59 OTHER LOW BACK PAIN: Primary | ICD-10-CM

## 2025-03-06 PROCEDURE — 97810 ACUP 1/> WO ESTIM 1ST 15 MIN: CPT | Performed by: ACUPUNCTURIST

## 2025-03-06 PROCEDURE — 97811 ACUP 1/> W/O ESTIM EA ADD 15: CPT | Performed by: ACUPUNCTURIST

## 2025-03-06 NOTE — PROGRESS NOTES
Acupuncture Visit:     Subjective   Patient ID: Hector Pearson is a 24 y.o. male who presents for No chief complaint on file.  Pt reported that his nausea has escalated.  His back pain has been minimal that abates with stretching, mostly what he feels is tightness.        Session Information  Is this acupuncture treatment being billed to the patient's insurance company: Yes  This is visit number: 5  The patient has a total number of visits of: 30  Initial Acupuncture Treatment date: 08/21/24  Name of Insurance Company: [facility]  Name of Supervising Physician: BRYCE  Visit Type: Follow-up visit         Review of Systems         Provider reviewed plan for the acupuncture session, precautions and contraindications. Patient/guardian/hospital staff has given consent to treat with full understanding of what to expect during the session. Before acupuncture began, provider explained to the patient to communicate at any time if the procedure was causing discomfort past their tolerance level. Patient agreed to advise acupuncturist. The acupuncturist counseled the patient on the risks of acupuncture treatment including pain, infection, bleeding, and no relief of pain. The patient was positioned comfortably. There was no evidence of infection at the site of needle insertions.    Objective   Physical Exam         Treatment Plan  Treatment Goals: Pain management    Acupuncture Treatment  Patient Position: Supine on a table  Needle Guage: 42 guage /.14/ Lime green seirin, 40 guage /.16/ Red seirin, 36 guage /.20/ Blue seirin  Body Points: With retention  Body Points - Left: cv 15, 12, 4, yin marvin  Body Points - Bilateral: immunex1, [place], st qi x2, k 3, 27,  Needle Count In: 16  Needle Count Out: 16  Needle Retention Time (min): 25 minutes              Assessment/Plan

## 2025-03-13 ENCOUNTER — APPOINTMENT (OUTPATIENT)
Dept: INTEGRATIVE MEDICINE | Facility: CLINIC | Age: 25
End: 2025-03-13
Payer: MEDICAID

## 2025-03-27 ENCOUNTER — APPOINTMENT (OUTPATIENT)
Dept: INTEGRATIVE MEDICINE | Facility: CLINIC | Age: 25
End: 2025-03-27
Payer: MEDICAID

## 2025-03-27 DIAGNOSIS — M54.59 OTHER LOW BACK PAIN: Primary | ICD-10-CM

## 2025-03-27 PROCEDURE — 97811 ACUP 1/> W/O ESTIM EA ADD 15: CPT | Performed by: ACUPUNCTURIST

## 2025-03-27 PROCEDURE — 97810 ACUP 1/> WO ESTIM 1ST 15 MIN: CPT | Performed by: ACUPUNCTURIST

## 2025-03-27 NOTE — PROGRESS NOTES
Acupuncture Visit:     Subjective   Patient ID: Hector Pearson is a 24 y.o. male who presents for No chief complaint on file.  PT reported back pain has escalated.  In addition he has feeling of heat though afebrile.  He shared that he has very large canker sore, assessed by dentist, that is painful.  Confirmed that he is taking recommended mouth wash (5 days) but reports no pain relief. He was advised to contact prescribing provider in next day or two to update on lack of symptom relief and clarify expectations for pain relief.      Also reported mother is managing health problems and he is finishing his spring term which elevate stress.        Session Information  Is this acupuncture treatment being billed to the patient's insurance company: Yes  This is visit number: 6  The patient has a total number of visits of: 30  Name of Insurance Company: [facility]  Visit Type: Follow-up visit         Review of Systems         Provider reviewed plan for the acupuncture session, precautions and contraindications. Patient/guardian/hospital staff has given consent to treat with full understanding of what to expect during the session. Before acupuncture began, provider explained to the patient to communicate at any time if the procedure was causing discomfort past their tolerance level. Patient agreed to advise acupuncturist. The acupuncturist counseled the patient on the risks of acupuncture treatment including pain, infection, bleeding, and no relief of pain. The patient was positioned comfortably. There was no evidence of infection at the site of needle insertions.    Objective   Physical Exam              Acupuncture Treatment  Patient Position: Supine on a table  Needle Guage: 40 guage /.16/ Red seirin, 42 guage /.14/ Lime green seirin  Body Points - Bilateral: st qi x2, sp 6, 9, li 11, 4, gb 41, sj 5, lr 3  Auricular Points: Yes  Auricular Points - Bilateral: autonomic black men  Needle Count In: 22  Needle Count Out:  22  Needle Retention Time (min): 25 minutes  Total Face to Face Time (min): 25 minutes              Assessment/Plan

## 2025-04-10 ENCOUNTER — APPOINTMENT (OUTPATIENT)
Dept: INTEGRATIVE MEDICINE | Facility: CLINIC | Age: 25
End: 2025-04-10
Payer: MEDICAID

## 2025-04-24 ENCOUNTER — APPOINTMENT (OUTPATIENT)
Dept: INTEGRATIVE MEDICINE | Facility: CLINIC | Age: 25
End: 2025-04-24
Payer: MEDICAID

## 2025-04-24 DIAGNOSIS — R11.0 NAUSEA: ICD-10-CM

## 2025-04-24 DIAGNOSIS — M54.59 OTHER LOW BACK PAIN: Primary | ICD-10-CM

## 2025-04-24 PROCEDURE — 97811 ACUP 1/> W/O ESTIM EA ADD 15: CPT | Performed by: ACUPUNCTURIST

## 2025-04-24 PROCEDURE — 97810 ACUP 1/> WO ESTIM 1ST 15 MIN: CPT | Performed by: ACUPUNCTURIST

## 2025-04-24 NOTE — PROGRESS NOTES
Acupuncture Visit:     Subjective   Patient ID: Hector Pearson is a 24 y.o. male who presents for No chief complaint on file.  Pt reported that his mom has been unwell but is now improving.  His low back has been ok but nausea has become more constant.  His allergies have also flared with spring weather.  He is experiencing itchy eyes and congestion.          Session Information  Is this acupuncture treatment being billed to the patient's insurance company: Yes  This is visit number: 4  The patient has a total number of visits of: 30  Initial Acupuncture Treatment date: 08/21/24  Name of Insurance Company: [employer]  Name of Supervising Physician: BRYCE  Visit Type: Follow-up visit  Medical History Reviewed: I have reviewed pertinent medical history in EHR, and no contraindications are present to provide treatment         Review of Systems         Provider reviewed plan for the acupuncture session, precautions and contraindications. Patient/guardian/hospital staff has given consent to treat with full understanding of what to expect during the session. Before acupuncture began, provider explained to the patient to communicate at any time if the procedure was causing discomfort past their tolerance level. Patient agreed to advise acupuncturist. The acupuncturist counseled the patient on the risks of acupuncture treatment including pain, infection, bleeding, and no relief of pain. The patient was positioned comfortably. There was no evidence of infection at the site of needle insertions.    Objective   Physical Exam         Treatment Plan  Treatment Goals: Nausea reduction, Pain management    Acupuncture Treatment  Needle Guage: 40 guage /.16/ Red seirin  Body Points - Bilateral: st qi x1, k 7, 10, 27, lr3, li 4, bitong, li 20, du 24, immunex1  Needle Count In: 19  Needle Count Out: 19  Needle Retention Time (min): 25 minutes  Total Face to Face Time (min): 25 minutes              Assessment/Plan

## 2025-05-08 ENCOUNTER — APPOINTMENT (OUTPATIENT)
Dept: INTEGRATIVE MEDICINE | Facility: CLINIC | Age: 25
End: 2025-05-08
Payer: MEDICAID

## 2025-05-08 DIAGNOSIS — R11.0 NAUSEA: ICD-10-CM

## 2025-05-08 DIAGNOSIS — M54.59 OTHER LOW BACK PAIN: Primary | ICD-10-CM

## 2025-05-08 PROCEDURE — 97811 ACUP 1/> W/O ESTIM EA ADD 15: CPT | Performed by: ACUPUNCTURIST

## 2025-05-08 PROCEDURE — 97810 ACUP 1/> WO ESTIM 1ST 15 MIN: CPT | Performed by: ACUPUNCTURIST

## 2025-05-08 NOTE — PROGRESS NOTES
Acupuncture Visit:     Subjective   Patient ID: Hector Pearson is a 24 y.o. male who presents for No chief complaint on file.  His low back has been ok, mild, would like to address  Minimal abdominal pain  Nausea 4/10 (instead of 8-9-/10 when he first came to get acupuncture)     His allergies have also flared with spring weather  He is experiencing itchy eyes/congestion         Session Information  Is this acupuncture treatment being billed to the patient's insurance company: Yes  This is visit number: 8  The patient has a total number of visits of: 30  Initial Acupuncture Treatment date: 01/08/25  Name of [facility] Company: [facility]  Name of Supervising Physician: BRYCE  Visit Type: Follow-up visit  Description of present complaint: Chronic pain         Review of Systems         Provider reviewed plan for the acupuncture session, precautions and contraindications. Patient/guardian/hospital staff has given consent to treat with full understanding of what to expect during the session. Before acupuncture began, provider explained to the patient to communicate at any time if the procedure was causing discomfort past their tolerance level. Patient agreed to advise acupuncturist. The acupuncturist counseled the patient on the risks of acupuncture treatment including pain, infection, bleeding, and no relief of pain. The patient was positioned comfortably. There was no evidence of infection at the site of needle insertions.    Objective   Physical Exam              Acupuncture Treatment  Patient Position: Supine on a table  Acupuncture Needling: Yes  Needle Guage: 40 guage /.16/ Red seirin  Body Points: With retention  Body Points - Bilateral: YT LI20-BT PC6 KI27 ST36 4 melba ST CH - LR3  KI6  Auricular Points: Yes  Auricular Points - Left: BFA 5 - mouth 1  Needle Count In: 23  Needle Count Out: 23  Needle Retention Time (min): 35 minutes  Total Face to Face Time (min): 25 minutes              Assessment/Plan

## 2025-05-30 ENCOUNTER — APPOINTMENT (OUTPATIENT)
Dept: INTEGRATIVE MEDICINE | Facility: CLINIC | Age: 25
End: 2025-05-30
Payer: MEDICAID

## 2025-06-11 ENCOUNTER — APPOINTMENT (OUTPATIENT)
Dept: INTEGRATIVE MEDICINE | Facility: CLINIC | Age: 25
End: 2025-06-11
Payer: MEDICAID

## 2025-06-30 ENCOUNTER — APPOINTMENT (OUTPATIENT)
Dept: INTEGRATIVE MEDICINE | Facility: CLINIC | Age: 25
End: 2025-06-30
Payer: MEDICAID

## 2025-07-09 ENCOUNTER — APPOINTMENT (OUTPATIENT)
Dept: INTEGRATIVE MEDICINE | Facility: CLINIC | Age: 25
End: 2025-07-09
Payer: MEDICAID

## 2025-07-09 DIAGNOSIS — R51.9 CHRONIC INTRACTABLE HEADACHE, UNSPECIFIED HEADACHE TYPE: ICD-10-CM

## 2025-07-09 DIAGNOSIS — M54.2 NECK PAIN: ICD-10-CM

## 2025-07-09 DIAGNOSIS — G89.29 CHRONIC INTRACTABLE HEADACHE, UNSPECIFIED HEADACHE TYPE: ICD-10-CM

## 2025-07-09 DIAGNOSIS — R11.0 NAUSEA: ICD-10-CM

## 2025-07-09 DIAGNOSIS — M54.59 OTHER LOW BACK PAIN: Primary | ICD-10-CM

## 2025-07-09 PROCEDURE — 97811 ACUP 1/> W/O ESTIM EA ADD 15: CPT | Performed by: ACUPUNCTURIST

## 2025-07-09 PROCEDURE — 97810 ACUP 1/> WO ESTIM 1ST 15 MIN: CPT | Performed by: ACUPUNCTURIST

## 2025-07-09 NOTE — PROGRESS NOTES
Acupuncture Visit:     Subjective   Patient ID: Hector Pearson is a 24 y.o. male who presents for No chief complaint on file.  PT reported his back has bothering him, pain does not disrupt sleep.  Nausea remains decreased over time but still present.  Experiencing right sided dull constant headache temporal and OCP with neck pain.          Session Information  Is this acupuncture treatment being billed to the patient's insurance company: Yes  This is visit number: 9  The patient has a total number of visits of: 30  Initial Acupuncture Treatment date: 01/08/25  Name of Insurance Company: [employer]         Review of Systems         Provider reviewed plan for the acupuncture session, precautions and contraindications. Patient/guardian/hospital staff has given consent to treat with full understanding of what to expect during the session. Before acupuncture began, provider explained to the patient to communicate at any time if the procedure was causing discomfort past their tolerance level. Patient agreed to advise acupuncturist. The acupuncturist counseled the patient on the risks of acupuncture treatment including pain, infection, bleeding, and no relief of pain. The patient was positioned comfortably. There was no evidence of infection at the site of needle insertions.    Objective   Physical Exam         Treatment Plan  Treatment Goals: Pain management, Nausea reduction    Acupuncture Treatment  Patient Position: Supine on a table  Needle Guage: 40 guage /.16/ Red seirin, 32 guage /.25/ Purple seirin  Body Points - Left: harsha maix2, cv 15,14, 12, 6, kate 5, lr4  Body Points - Bilateral: immunex2  Body Points - Right: gb 21, 20, st 8  Needle Count In: 15  Needle Count Out: 15  Needle Retention Time (min): 30 minutes  Total Face to Face Time (min): 25 minutes              Assessment/Plan

## 2025-07-23 ENCOUNTER — APPOINTMENT (OUTPATIENT)
Dept: INTEGRATIVE MEDICINE | Facility: CLINIC | Age: 25
End: 2025-07-23
Payer: MEDICAID